# Patient Record
Sex: MALE | Race: WHITE | URBAN - METROPOLITAN AREA
[De-identification: names, ages, dates, MRNs, and addresses within clinical notes are randomized per-mention and may not be internally consistent; named-entity substitution may affect disease eponyms.]

---

## 2022-01-25 ENCOUNTER — OFFICE VISIT (OUTPATIENT)
Dept: INTERNAL MEDICINE CLINIC | Age: 52
End: 2022-01-25
Payer: COMMERCIAL

## 2022-01-25 VITALS
BODY MASS INDEX: 39.03 KG/M2 | TEMPERATURE: 98.4 F | HEART RATE: 63 BPM | SYSTOLIC BLOOD PRESSURE: 139 MMHG | DIASTOLIC BLOOD PRESSURE: 86 MMHG | WEIGHT: 278.8 LBS | HEIGHT: 71 IN | RESPIRATION RATE: 16 BRPM | OXYGEN SATURATION: 97 %

## 2022-01-25 DIAGNOSIS — I10 ESSENTIAL HYPERTENSION: ICD-10-CM

## 2022-01-25 DIAGNOSIS — L30.9 DERMATITIS: ICD-10-CM

## 2022-01-25 DIAGNOSIS — J01.10 ACUTE NON-RECURRENT FRONTAL SINUSITIS: ICD-10-CM

## 2022-01-25 DIAGNOSIS — I25.10 CORONARY ARTERY DISEASE INVOLVING NATIVE CORONARY ARTERY OF NATIVE HEART WITHOUT ANGINA PECTORIS: ICD-10-CM

## 2022-01-25 DIAGNOSIS — Z12.11 ENCOUNTER FOR SCREENING COLONOSCOPY: ICD-10-CM

## 2022-01-25 DIAGNOSIS — E66.01 CLASS 2 SEVERE OBESITY DUE TO EXCESS CALORIES WITH SERIOUS COMORBIDITY AND BODY MASS INDEX (BMI) OF 38.0 TO 38.9 IN ADULT (HCC): ICD-10-CM

## 2022-01-25 DIAGNOSIS — R97.20 ELEVATED PSA: ICD-10-CM

## 2022-01-25 DIAGNOSIS — Z00.00 PHYSICAL EXAM: Primary | ICD-10-CM

## 2022-01-25 DIAGNOSIS — Z95.5 HISTORY OF HEART ARTERY STENT: ICD-10-CM

## 2022-01-25 DIAGNOSIS — R35.89 POLYURIA: ICD-10-CM

## 2022-01-25 DIAGNOSIS — E88.81 INSULIN RESISTANCE: ICD-10-CM

## 2022-01-25 DIAGNOSIS — E78.2 MIXED HYPERLIPIDEMIA: ICD-10-CM

## 2022-01-25 DIAGNOSIS — Z12.5 SCREENING FOR PROSTATE CANCER: ICD-10-CM

## 2022-01-25 PROCEDURE — 99214 OFFICE O/P EST MOD 30 MIN: CPT | Performed by: NURSE PRACTITIONER

## 2022-01-25 RX ORDER — LOSARTAN POTASSIUM 100 MG/1
100 TABLET ORAL DAILY
COMMUNITY

## 2022-01-25 RX ORDER — EZETIMIBE 10 MG/1
10 TABLET ORAL
COMMUNITY

## 2022-01-25 RX ORDER — TRIAMCINOLONE ACETONIDE 5 MG/G
OINTMENT TOPICAL 2 TIMES DAILY
Qty: 30 G | Refills: 0 | Status: SHIPPED | OUTPATIENT
Start: 2022-01-25 | End: 2022-02-09 | Stop reason: ALTCHOICE

## 2022-01-25 RX ORDER — NEBIVOLOL 5 MG/1
5 TABLET ORAL DAILY
COMMUNITY
End: 2022-08-04 | Stop reason: DRUGHIGH

## 2022-01-25 RX ORDER — AMOXICILLIN AND CLAVULANATE POTASSIUM 875; 125 MG/1; MG/1
1 TABLET, FILM COATED ORAL 2 TIMES DAILY
Qty: 20 TABLET | Refills: 0 | Status: SHIPPED | OUTPATIENT
Start: 2022-01-25 | End: 2022-04-26

## 2022-01-25 RX ORDER — HYDROXYZINE HYDROCHLORIDE 10 MG/1
10 TABLET, FILM COATED ORAL
Qty: 30 TABLET | Refills: 0 | Status: SHIPPED | OUTPATIENT
Start: 2022-01-25 | End: 2022-02-04

## 2022-01-25 RX ORDER — AMLODIPINE BESYLATE 10 MG/1
10 TABLET ORAL DAILY
COMMUNITY
End: 2022-08-04 | Stop reason: ALTCHOICE

## 2022-01-25 NOTE — PROGRESS NOTES
Chief Complaint   Patient presents with    [facility]     new patient       SUBJECTIVE:    Yasmin Mariscal is a 46 y.o. male who is new to me, here today for a general physical exam and to discuss current medical concerns and conditions. He is fasting today. Patient states he was seen by a previous primary care provider in the past, but has been without one locally for some time. He is currently being followed by cardiology due to history of coronary artery disease and stents x4. He denies any history of MI. He states he is feeling fairly well overall, and taking his medications as prescribed. He complains of a rash that has been present to his arms and legs for the past month or so would like this looked at today. Patient is currently taking several medications for management of hypertension and dyslipidemia. He states he is tolerating the medication well, and denies any adverse side effects. In addition, he denies any chest pain, chest pressure, shortness of breath, headaches, dizziness, blurred vision, palpitations, or syncope episodes. He states he tries to be watchful of his diet overall, but not always. He is not purposefully exercising on a regular basis. He is also complaining of increased facial pressure with thick nasal discharge and possible sinus infection that has been bothering him for the past week or so. He has not been using any over-the-counter medications to treat his symptoms. He states the rash to his arms and legs started approximately 1 month ago after a hunting trip, and had a virtual visit with a provider at that time. He states he was placed on some oral steroids and topical steroids which helped somewhat, but did not like the side effects of the steroids which kept him from sleeping well. He states the rash itches worse at night, and often after showering. He states he has also noticed some increased polyuria over the past few months.   He would like to be tested for diabetes. He has been vaccinated for COVID-19. Current Outpatient Medications   Medication Sig Dispense Refill    ezetimibe (ZETIA) 10 mg tablet Take 10 mg by mouth.  losartan (COZAAR) 100 mg tablet Take 100 mg by mouth daily.  pitavastatin calcium (Livalo) 4 mg tab tablet Take 4 mg by mouth daily.  nebivoloL (BYSTOLIC) 5 mg tablet Take 5 mg by mouth daily.  amLODIPine (NORVASC) 10 mg tablet Take 10 mg by mouth daily.  amoxicillin-clavulanate (AUGMENTIN) 875-125 mg per tablet Take 1 Tablet by mouth two (2) times a day. 20 Tablet 0    triamcinolone acetonide (KENALOG) 0.5 % ointment Apply  to affected area two (2) times a day. use thin layer 30 g 0     History reviewed. No pertinent past medical history. History reviewed. No pertinent surgical history.   Not on File    REVIEW OF SYSTEMS:                                        POSITIVE= bold text  Negative = regular text    General:                     fever, chills, sweats, generalized weakness, weight loss/gain,                                       loss of appetite   Eyes:                           blurred vision, eye pain, loss of vision, double vision  ENT:                            rhinorrhea, pharyngitis, nasal discharge  Respiratory:               cough, sputum production, SOB, GRANADOS, wheezing, pleuritic pain   Cardiology:                chest pain, palpitations, orthopnea, PND, edema, syncope   Gastrointestinal:       abdominal pain , N/V, diarrhea, dysphagia, constipation, bleeding   Genitourinary:           frequency, urgency, dysuria, hematuria, incontinence   Muskuloskeletal :      arthralgia, myalgia, back pain  Hematology:              easy bruising, nose or gum bleeding, lymphadenopathy   Dermatological:         rash, ulceration, pruritis, color change / jaundice  Endocrine:                 hot flashes or polydipsia, polyuria  Neurological:             headache, dizziness, confusion, focal weakness, paresthesia,                                      Speech difficulties, memory loss, gait difficulty  Psychological:          Feelings of anxiety, depression, agitation        Social History     Socioeconomic History    Marital status: SINGLE   Tobacco Use    Smoking status: Never Smoker    Smokeless tobacco: Never Used   Vaping Use    Vaping Use: Never used   Substance and Sexual Activity    Alcohol use: Yes     Comment: occ     No family history on file. OBJECTIVE:     Visit Vitals  /86 (BP 1 Location: Left upper arm, BP Patient Position: Sitting, BP Cuff Size: Large adult)   Pulse 63   Temp 98.4 °F (36.9 °C)   Resp 16   Ht 5' 11\" (1.803 m)   Wt 278 lb 12.8 oz (126.5 kg)   SpO2 97%   BMI 38.88 kg/m²       Constitutional: He appears well nourished, of stated age, and dressed appropriately. Eyes: Sclera anicteric, PERRLA, EOMI  ENT: Nares clear, reddened turbinates bilaterally with reddened oropharynx. Neck: Supple without lymphadenopathy. Thyroid normal, No JVD or bruits  Respiratory: Clear to ascultation X5, normal inspiratory effort, no adventitious breath sounds. Cardiovascular: Regular rate and rhythm, no rubs or gallops, PMI not displaced, No thrills, no peripheral edema  Gastrointestinal: Abdomen non-distended, soft, non-tender, bowel sounds normal and active X4. Hematologic: No purpura, petechiae or unexplained bruising  Lymphatic: No lymph node enlargemant. Musculoskeletal: No joint pain or swelling on palpation. No AP/Lateral asymmetry noted. Integumentary: Large irregular reddened patches to lateral aspects of both lower extremities. Small patches to upper extremities. Likely contact in nature. There are no open or weeping areas. There is evidence of aggressive scratching. Neuro: Non-focal exam, A & O X 3  Psychiatric: Appropriate affect and demeanor, pleasant and cooperative. Patient's thought content and thought processing appear to be within normal limits.          RIGHT LOWER LEG        LEFT LOWER LEG    ASSESSMENT/PLAN:     ICD-10-CM ICD-9-CM    1. Physical exam  Z00.00 V70.9    2. Essential hypertension  I10 401.9 CBC W/O DIFF      METABOLIC PANEL, COMPREHENSIVE      TSH 3RD GENERATION      URINALYSIS W/ RFLX MICROSCOPIC      URINALYSIS W/ RFLX MICROSCOPIC      TSH 3RD GENERATION      METABOLIC PANEL, COMPREHENSIVE      CBC W/O DIFF   3. Mixed hyperlipidemia  E78.2 272.2 LIPID PANEL      LIPID PANEL   4. Coronary artery disease involving native coronary artery of native heart without angina pectoris  I25.10 414.01 LIPID PANEL      LIPID PANEL   5. Dermatitis  L30.9 692.9 triamcinolone acetonide (KENALOG) 0.5 % ointment   6. Acute non-recurrent frontal sinusitis  J01.10 461.1 amoxicillin-clavulanate (AUGMENTIN) 875-125 mg per tablet   7. Polyuria  M91.98 064.08 METABOLIC PANEL, COMPREHENSIVE      TSH 3RD GENERATION      URINALYSIS W/ RFLX MICROSCOPIC      HEMOGLOBIN A1C WITH EAG      HEMOGLOBIN A1C WITH EAG      URINALYSIS W/ RFLX MICROSCOPIC      TSH 3RD GENERATION      METABOLIC PANEL, COMPREHENSIVE   8. Class 2 severe obesity due to excess calories with serious comorbidity and body mass index (BMI) of 38.0 to 38.9 in adult ([facility])  E66.01 278.01 TSH 3RD GENERATION    Z68.38 V85.38 HEMOGLOBIN A1C WITH EAG      HEMOGLOBIN A1C WITH EAG      TSH 3RD GENERATION   9. Insulin resistance  E88.81 277.7 HEMOGLOBIN A1C WITH EAG      HEMOGLOBIN A1C WITH EAG   10. Screening for prostate cancer  Z12.5 V76.44 PSA, DIAGNOSTIC (PROSTATE SPECIFIC AG)      PSA, DIAGNOSTIC (PROSTATE SPECIFIC AG)   11. History of heart artery stent  Z95.5 V45.82 LIPID PANEL      LIPID PANEL   12. Encounter for screening colonoscopy  Z12.11 V76.51 REFERRAL FOR COLONOSCOPY     1: We will do baseline labs including: CBC, CMP, lipid panel, TSH, hemoglobin A1c, urinalysis, and PSA. 2: I will prescribe triamcinolone ointment 0.5% for patient to use to itching areas twice daily until clear.   Patient to discontinue Tide detergent. Start all Free and clear. 3: Patient will be referred to gastroenterology for colonoscopy. 4: Patient to work on healthy lifestyle management including: Low-fat/low-cholesterol diet, adequate amounts of fiber water, and exercise as tolerated. Weight loss is preferred. 5: Patient to continue all current medications for management of hypertension and dyslipidemia. Follow-up with cardiology as planned. 6: Patient to follow-up with me in approximately 3 months, or sooner as needed. Patient states understanding and agrees with plan. 7: Hydroxyzine 10 mg sent to pharmacy. Take for itching. Orders Placed This Encounter    CBC W/O DIFF    METABOLIC PANEL, COMPREHENSIVE    LIPID PANEL    TSH 3RD GENERATION    URINALYSIS W/ RFLX MICROSCOPIC    PROSTATE SPECIFIC AG    HEMOGLOBIN A1C WITH EAG    Referral for Colonoscopy (options for GI, Colon &  Rectal Surgery, & General Surgery)    ezetimibe (ZETIA) 10 mg tablet    losartan (COZAAR) 100 mg tablet    pitavastatin calcium (Livalo) 4 mg tab tablet    nebivoloL (BYSTOLIC) 5 mg tablet    amLODIPine (NORVASC) 10 mg tablet    amoxicillin-clavulanate (AUGMENTIN) 875-125 mg per tablet    triamcinolone acetonide (KENALOG) 0.5 % ointment         ATTENTION:   This medical record was transcribed using an electronic medical records system. Although proofread, it may and can contain electronic and spelling errors. Other human spelling and other errors may be present. Corrections may be executed at a later time. Please feel free to contact us for any clarifications as needed. Signed,  Tona Enriquez.  Sabrina Austin, MSN APRN FNP-BC

## 2022-01-25 NOTE — PROGRESS NOTES
Jordy Naik is a 46 y.o. male    Chief Complaint   Patient presents with   [facility]     new patient       Visit Vitals  /86 (BP 1 Location: Left upper arm, BP Patient Position: Sitting, BP Cuff Size: Large adult)   Pulse 63   Temp 98.4 °F (36.9 °C)   Resp 16   Ht 5' 11\" (1.803 m)   Wt 278 lb 12.8 oz (126.5 kg)   SpO2 97%   BMI 38.88 kg/m²           1. Have you been to the ER, urgent care clinic since your last visit? Hospitalized since your last visit? NO    2. Have you seen or consulted any other health care providers outside of the [address] [place] [address] since your last visit? Include any pap smears or colon screening.  NO

## 2022-01-26 LAB
ALBUMIN SERPL-MCNC: 4.4 G/DL (ref 3.8–4.9)
ALBUMIN/GLOB SERPL: 2.2 {RATIO} (ref 1.2–2.2)
ALP SERPL-CCNC: 68 IU/L (ref 44–121)
ALT SERPL-CCNC: 21 IU/L (ref 0–44)
APPEARANCE UR: CLEAR
AST SERPL-CCNC: 19 IU/L (ref 0–40)
BILIRUB SERPL-MCNC: 0.8 MG/DL (ref 0–1.2)
BILIRUB UR QL STRIP: NEGATIVE
BUN SERPL-MCNC: 11 MG/DL (ref 6–24)
BUN/CREAT SERPL: 10 (ref 9–20)
CALCIUM SERPL-MCNC: 9.4 MG/DL (ref 8.7–10.2)
CHLORIDE SERPL-SCNC: 105 MMOL/L (ref 96–106)
CHOLEST SERPL-MCNC: 146 MG/DL (ref 100–199)
CO2 SERPL-SCNC: 25 MMOL/L (ref 20–29)
COLOR UR: YELLOW
CREAT SERPL-MCNC: 1.05 MG/DL (ref 0.76–1.27)
ERYTHROCYTE [DISTWIDTH] IN BLOOD BY AUTOMATED COUNT: 12.5 % (ref 11.6–15.4)
EST. AVERAGE GLUCOSE BLD GHB EST-MCNC: 103 MG/DL
GLOBULIN SER CALC-MCNC: 2 G/DL (ref 1.5–4.5)
GLUCOSE SERPL-MCNC: 98 MG/DL (ref 65–99)
GLUCOSE UR QL STRIP: NEGATIVE
HBA1C MFR BLD: 5.2 % (ref 4.8–5.6)
HCT VFR BLD AUTO: 44.1 % (ref 37.5–51)
HDLC SERPL-MCNC: 51 MG/DL
HGB BLD-MCNC: 15 G/DL (ref 13–17.7)
HGB UR QL STRIP: NEGATIVE
KETONES UR QL STRIP: NEGATIVE
LDLC SERPL CALC-MCNC: 80 MG/DL (ref 0–99)
LEUKOCYTE ESTERASE UR QL STRIP: NEGATIVE
MCH RBC QN AUTO: 30.5 PG (ref 26.6–33)
MCHC RBC AUTO-ENTMCNC: 34 G/DL (ref 31.5–35.7)
MCV RBC AUTO: 90 FL (ref 79–97)
MICRO URNS: NORMAL
NITRITE UR QL STRIP: NEGATIVE
PH UR STRIP: 6.5 [PH] (ref 5–7.5)
PLATELET # BLD AUTO: 152 X10E3/UL (ref 150–450)
POTASSIUM SERPL-SCNC: 4.1 MMOL/L (ref 3.5–5.2)
PROT SERPL-MCNC: 6.4 G/DL (ref 6–8.5)
PROT UR QL STRIP: NEGATIVE
PSA SERPL-MCNC: 4.3 NG/ML (ref 0–4)
RBC # BLD AUTO: 4.92 X10E6/UL (ref 4.14–5.8)
SODIUM SERPL-SCNC: 142 MMOL/L (ref 134–144)
SP GR UR STRIP: 1.02 (ref 1–1.03)
SPECIMEN STATUS REPORT, ROLRST: NORMAL
TRIGL SERPL-MCNC: 76 MG/DL (ref 0–149)
TSH SERPL-ACNC: 1.14 UIU/ML (ref 0.45–4.5)
UROBILINOGEN UR STRIP-MCNC: 1 MG/DL (ref 0.2–1)
VLDLC SERPL CALC-MCNC: 15 MG/DL (ref 5–40)
WBC # BLD AUTO: 7 X10E3/UL (ref 3.4–10.8)

## 2022-01-27 NOTE — PROGRESS NOTES
Hemoglobin A1c, a marker for diabetes shows normal level. PSA shows to be slightly elevated above normal.  For this, I am going to refer you to a urologist for further evaluation. Urinalysis is normal with no signs of infection. Thyroid appears to be functioning normally. Cholesterol levels are in an acceptable range. Continue Livalo 4 mg daily. Kidney function, liver functions, and electrolytes are normal.  Blood count shows no signs of anemia. We will recheck these labs in 6 months.

## 2022-02-08 ENCOUNTER — PATIENT MESSAGE (OUTPATIENT)
Dept: INTERNAL MEDICINE CLINIC | Age: 52
End: 2022-02-08

## 2022-02-08 DIAGNOSIS — L30.9 DERMATITIS: Primary | ICD-10-CM

## 2022-02-09 RX ORDER — CLOBETASOL PROPIONATE 0.5 MG/G
CREAM TOPICAL 2 TIMES DAILY
Qty: 30 G | Refills: 1 | Status: SHIPPED | OUTPATIENT
Start: 2022-02-09 | End: 2022-08-04

## 2022-02-09 NOTE — TELEPHONE ENCOUNTER
From: Mary Van  To: Ishan Nguyen NP  Sent: 2/8/2022 5:36 PM EST  Subject: Rash    My rash is not getting better.  Can you prescribe a stronger cream? I will try and get into a dermatologist but thats impossible in the short term

## 2022-03-18 PROBLEM — E88.81 INSULIN RESISTANCE: Status: ACTIVE | Noted: 2022-01-25

## 2022-03-18 PROBLEM — Z95.5 HISTORY OF HEART ARTERY STENT: Status: ACTIVE | Noted: 2022-01-25

## 2022-03-18 PROBLEM — E88.819 INSULIN RESISTANCE: Status: ACTIVE | Noted: 2022-01-25

## 2022-03-18 PROBLEM — I25.10 CORONARY ARTERY DISEASE INVOLVING NATIVE CORONARY ARTERY OF NATIVE HEART WITHOUT ANGINA PECTORIS: Status: ACTIVE | Noted: 2022-01-25

## 2022-03-18 PROBLEM — R35.89 POLYURIA: Status: ACTIVE | Noted: 2022-01-25

## 2022-03-19 PROBLEM — E66.01 CLASS 2 SEVERE OBESITY DUE TO EXCESS CALORIES WITH SERIOUS COMORBIDITY AND BODY MASS INDEX (BMI) OF 38.0 TO 38.9 IN ADULT (HCC): Status: ACTIVE | Noted: 2022-01-25

## 2022-03-19 PROBLEM — E78.2 MIXED HYPERLIPIDEMIA: Status: ACTIVE | Noted: 2022-01-25

## 2022-03-19 PROBLEM — E66.812 CLASS 2 SEVERE OBESITY DUE TO EXCESS CALORIES WITH SERIOUS COMORBIDITY AND BODY MASS INDEX (BMI) OF 38.0 TO 38.9 IN ADULT: Status: ACTIVE | Noted: 2022-01-25

## 2022-03-20 PROBLEM — I10 ESSENTIAL HYPERTENSION: Status: ACTIVE | Noted: 2022-01-25

## 2022-04-26 ENCOUNTER — OFFICE VISIT (OUTPATIENT)
Dept: INTERNAL MEDICINE CLINIC | Age: 52
End: 2022-04-26
Payer: COMMERCIAL

## 2022-04-26 VITALS
HEART RATE: 72 BPM | BODY MASS INDEX: 35.9 KG/M2 | HEIGHT: 71 IN | TEMPERATURE: 98.4 F | DIASTOLIC BLOOD PRESSURE: 88 MMHG | SYSTOLIC BLOOD PRESSURE: 138 MMHG | RESPIRATION RATE: 16 BRPM | WEIGHT: 256.4 LBS | OXYGEN SATURATION: 97 %

## 2022-04-26 DIAGNOSIS — E66.01 CLASS 2 SEVERE OBESITY DUE TO EXCESS CALORIES WITH SERIOUS COMORBIDITY AND BODY MASS INDEX (BMI) OF 35.0 TO 35.9 IN ADULT (HCC): ICD-10-CM

## 2022-04-26 DIAGNOSIS — I10 ESSENTIAL HYPERTENSION: Primary | ICD-10-CM

## 2022-04-26 DIAGNOSIS — I25.10 CORONARY ARTERY DISEASE INVOLVING NATIVE CORONARY ARTERY OF NATIVE HEART WITHOUT ANGINA PECTORIS: ICD-10-CM

## 2022-04-26 DIAGNOSIS — E78.2 MIXED HYPERLIPIDEMIA: ICD-10-CM

## 2022-04-26 DIAGNOSIS — Z79.899 ON STATIN THERAPY: ICD-10-CM

## 2022-04-26 PROCEDURE — 99214 OFFICE O/P EST MOD 30 MIN: CPT | Performed by: NURSE PRACTITIONER

## 2022-04-26 RX ORDER — HYDROCHLOROTHIAZIDE 12.5 MG/1
12.5 TABLET ORAL DAILY
Qty: 90 TABLET | Refills: 1 | Status: SHIPPED | OUTPATIENT
Start: 2022-04-26 | End: 2022-08-23

## 2022-04-26 NOTE — PROGRESS NOTES
Chief Complaint   Patient presents with    Follow-up     3 month follow up       SUBJECTIVE:    Elisa Pierce is a 46 y.o. male who is here today for a follow up appointment regarding his history of hypertension, mixed hyperlipidemia, coronary artery disease with stent, and obesity. He states he is feeling well overall, and denies any new or acute complaints at this time. He states he is taking his medications as prescribed, and denies any adverse side effects he is checking his blood pressure occasionally, but not regularly. He denies any chest pain, chest pressure, shortness of breath, headaches, dizziness, blurred vision, palpitations, or syncope episodes. The patient also had previously a rash to his left lower extremity which he was seen by dermatology about. He was recommended to discontinue his nebivolol due to possibility of drug-induced rash. However, he has seen no significant improvement as a result of discontinuation of his medication. He has talked to his cardiologist about this who stated they may likely replace him with a different beta-blocker, but has not received one from them yet. Additionally, he has also been working on weight loss and RVA diet plan. He has lost approximately 22 pounds since our last encounter. He states he is feeling good about this. Current Outpatient Medications   Medication Sig Dispense Refill    hydroCHLOROthiazide (HYDRODIURIL) 12.5 mg tablet Take 1 Tablet by mouth daily. Indications: high blood pressure 90 Tablet 1    clobetasoL (TEMOVATE) 0.05 % topical cream Apply  to affected area two (2) times a day. Use thin layer 30 g 1    ezetimibe (ZETIA) 10 mg tablet Take 10 mg by mouth.  losartan (COZAAR) 100 mg tablet Take 100 mg by mouth daily.  pitavastatin calcium (Livalo) 4 mg tab tablet Take 4 mg by mouth daily.  amLODIPine (NORVASC) 10 mg tablet Take 10 mg by mouth daily.       nebivoloL (BYSTOLIC) 5 mg tablet Take 5 mg by mouth daily.       History reviewed. No pertinent past medical history. History reviewed. No pertinent surgical history. Not on File    REVIEW OF SYSTEMS:                                        POSITIVE= bold text  Negative = regular text    General:                     fever, chills, sweats, generalized weakness, weight loss/gain,                                       loss of appetite   Eyes:                           blurred vision, eye pain, loss of vision, double vision  ENT:                            rhinorrhea, pharyngitis   Respiratory:               cough, sputum production, SOB, GRANADOS, wheezing, pleuritic pain   Cardiology:                chest pain, palpitations, orthopnea, PND, edema, syncope   Gastrointestinal:       abdominal pain , N/V, diarrhea, dysphagia, constipation, bleeding   Genitourinary:           frequency, urgency, dysuria, hematuria, incontinence   Muskuloskeletal :      arthralgia, myalgia, back pain  Hematology:              easy bruising, nose or gum bleeding, lymphadenopathy   Dermatological:         rash, ulceration, pruritis, color change / jaundice  Endocrine:                 hot flashes or polydipsia   Neurological:             headache, dizziness, confusion, focal weakness, paresthesia,                                      Speech difficulties, memory loss, gait difficulty  Psychological:          Feelings of anxiety, depression, agitation        Social History     Socioeconomic History    Marital status: SINGLE   Tobacco Use    Smoking status: Never Smoker    Smokeless tobacco: Never Used   Vaping Use    Vaping Use: Never used   Substance and Sexual Activity    Alcohol use: Yes     Comment: occ     History reviewed. No pertinent family history.     OBJECTIVE:     Visit Vitals  /88 (BP 1 Location: Left upper arm, BP Patient Position: Sitting)   Pulse 72   Temp 98.4 °F (36.9 °C)   Resp 16   Ht 5' 11\" (1.803 m)   Wt 256 lb 6.4 oz (116.3 kg)   SpO2 97%   BMI 35.76 kg/m² Constitutional: He appears well nourished, of stated age, and dressed appropriately. Eyes: Sclera anicteric, PERRLA, EOMI  Neck: Supple without lymphadenopathy. Thyroid normal, No JVD or bruits  Respiratory: Clear to ascultation X5, normal inspiratory effort, no adventitious breath sounds. Cardiovascular: Regular rate and rhythm, no rubs or gallops, PMI not displaced, No thrills, no peripheral edema  Neuro: Non-focal exam, A & O X 3.   Psychiatric: Appropriate affect and demeanor, pleasant and cooperative. Patient's thought content and thought processing appear to be within normal limits. ASSESSMENT/PLAN:     ICD-10-CM ICD-9-CM    1. Essential hypertension  Y47 713.1 METABOLIC PANEL, COMPREHENSIVE      hydroCHLOROthiazide (HYDRODIURIL) 12.5 mg tablet      METABOLIC PANEL, COMPREHENSIVE   2. Mixed hyperlipidemia  E78.2 272.2 LIPID PANEL      LIPID PANEL   3. Coronary artery disease involving native coronary artery of native heart without angina pectoris  I25.10 414.01    4. Class 2 severe obesity due to excess calories with serious comorbidity and body mass index (BMI) of 35.0 to 35.9 in adult ([facility])  E66.01 278.01     Z68.35 V85.35    5. On statin therapy  Z79.899 V58.69      1: We will repeat labs today including: CMP, and lipid panel. 2: I will add HCTZ 12.5 mg to patient's current medication regimen for improved management of hypertension. 3: Follow-up with cardiology as planned. 4: Continue healthy lifestyle management and weight loss. 5: Continue all other medications as directed. 6: Follow-up with me in approximately 6 months, or sooner as needed. Patient states understanding and agrees with plan. Orders Placed This Encounter    METABOLIC PANEL, COMPREHENSIVE    LIPID PANEL    hydroCHLOROthiazide (HYDRODIURIL) 12.5 mg tablet         ATTENTION:   This medical record was transcribed using an electronic medical records system.   Although proofread, it may and can contain electronic and spelling errors. Other human spelling and other errors may be present. Corrections may be executed at a later time. Please feel free to contact us for any clarifications as needed. Follow-up and Dispositions    · Return in about 6 months (around 10/26/2022) for Follow up with fasting labs. Signed,  Selene Lanes.  Pretty Chapman, MSN APRN FNP-BC

## 2022-04-26 NOTE — PROGRESS NOTES
Nadine Baker is a 46 y.o. male    Chief Complaint   Patient presents with    Follow-up     3 month follow up       Visit Vitals  BP (!) 138/90 (BP 1 Location: Left upper arm, BP Patient Position: Sitting, BP Cuff Size: Large adult)   Pulse 72   Temp 98.4 °F (36.9 °C)   Resp 16   Ht 5' 11\" (1.803 m)   Wt 256 lb 6.4 oz (116.3 kg)   SpO2 97%   BMI 35.76 kg/m²           1. Have you been to the ER, urgent care clinic since your last visit? Hospitalized since your last visit? NO    2. Have you seen or consulted any other health care providers outside of the [address] [place] [address] since your last visit? Include any pap smears or colon screening.  NO

## 2022-04-27 LAB
ALBUMIN SERPL-MCNC: 4.6 G/DL (ref 3.8–4.9)
ALBUMIN/GLOB SERPL: 2.3 {RATIO} (ref 1.2–2.2)
ALP SERPL-CCNC: 79 IU/L (ref 44–121)
ALT SERPL-CCNC: 18 IU/L (ref 0–44)
AST SERPL-CCNC: 20 IU/L (ref 0–40)
BILIRUB SERPL-MCNC: 0.6 MG/DL (ref 0–1.2)
BUN SERPL-MCNC: 11 MG/DL (ref 6–24)
BUN/CREAT SERPL: 14 (ref 9–20)
CALCIUM SERPL-MCNC: 9.4 MG/DL (ref 8.7–10.2)
CHLORIDE SERPL-SCNC: 103 MMOL/L (ref 96–106)
CHOLEST SERPL-MCNC: 147 MG/DL (ref 100–199)
CO2 SERPL-SCNC: 23 MMOL/L (ref 20–29)
CREAT SERPL-MCNC: 0.78 MG/DL (ref 0.76–1.27)
EGFR: 107 ML/MIN/1.73
GLOBULIN SER CALC-MCNC: 2 G/DL (ref 1.5–4.5)
GLUCOSE SERPL-MCNC: 82 MG/DL (ref 65–99)
HDLC SERPL-MCNC: 50 MG/DL
LDLC SERPL CALC-MCNC: 83 MG/DL (ref 0–99)
POTASSIUM SERPL-SCNC: 4.1 MMOL/L (ref 3.5–5.2)
PROT SERPL-MCNC: 6.6 G/DL (ref 6–8.5)
SODIUM SERPL-SCNC: 142 MMOL/L (ref 134–144)
TRIGL SERPL-MCNC: 70 MG/DL (ref 0–149)
VLDLC SERPL CALC-MCNC: 14 MG/DL (ref 5–40)

## 2022-04-28 NOTE — PROGRESS NOTES
Cholesterol levels look good overall. Continue Livalo 4 mg daily. Metabolic panel is normal.  Kidney function, liver functions, and electrolytes are within normal limits.

## 2022-08-04 ENCOUNTER — OFFICE VISIT (OUTPATIENT)
Dept: INTERNAL MEDICINE CLINIC | Age: 52
End: 2022-08-04
Payer: COMMERCIAL

## 2022-08-04 VITALS
BODY MASS INDEX: 36.93 KG/M2 | HEART RATE: 65 BPM | SYSTOLIC BLOOD PRESSURE: 134 MMHG | DIASTOLIC BLOOD PRESSURE: 82 MMHG | RESPIRATION RATE: 16 BRPM | HEIGHT: 71 IN | OXYGEN SATURATION: 97 % | WEIGHT: 263.8 LBS | TEMPERATURE: 98.1 F

## 2022-08-04 DIAGNOSIS — I10 ESSENTIAL HYPERTENSION: Primary | ICD-10-CM

## 2022-08-04 DIAGNOSIS — E66.01 CLASS 2 SEVERE OBESITY DUE TO EXCESS CALORIES WITH SERIOUS COMORBIDITY AND BODY MASS INDEX (BMI) OF 36.0 TO 36.9 IN ADULT (HCC): ICD-10-CM

## 2022-08-04 DIAGNOSIS — Z79.899 ON STATIN THERAPY: ICD-10-CM

## 2022-08-04 DIAGNOSIS — R97.20 ELEVATED PSA: ICD-10-CM

## 2022-08-04 DIAGNOSIS — J01.90 ACUTE NON-RECURRENT SINUSITIS, UNSPECIFIED LOCATION: ICD-10-CM

## 2022-08-04 DIAGNOSIS — E78.2 MIXED HYPERLIPIDEMIA: ICD-10-CM

## 2022-08-04 DIAGNOSIS — J06.9 UPPER RESPIRATORY TRACT INFECTION, UNSPECIFIED TYPE: ICD-10-CM

## 2022-08-04 DIAGNOSIS — Z86.16 HISTORY OF COVID-19: ICD-10-CM

## 2022-08-04 DIAGNOSIS — L30.9 CHRONIC DERMATITIS: ICD-10-CM

## 2022-08-04 PROBLEM — E66.812 CLASS 2 SEVERE OBESITY DUE TO EXCESS CALORIES WITH SERIOUS COMORBIDITY AND BODY MASS INDEX (BMI) OF 36.0 TO 36.9 IN ADULT: Status: ACTIVE | Noted: 2022-01-25

## 2022-08-04 PROCEDURE — 99214 OFFICE O/P EST MOD 30 MIN: CPT | Performed by: NURSE PRACTITIONER

## 2022-08-04 RX ORDER — PIMECROLIMUS 10 MG/G
CREAM TOPICAL
COMMUNITY
Start: 2022-06-23

## 2022-08-04 RX ORDER — CLOBETASOL PROPIONATE 0.5 MG/G
OINTMENT TOPICAL
COMMUNITY
Start: 2022-08-01

## 2022-08-04 RX ORDER — ASPIRIN 81 MG/1
81 TABLET ORAL DAILY
COMMUNITY

## 2022-08-04 RX ORDER — DOXYCYCLINE 100 MG/1
100 TABLET ORAL 2 TIMES DAILY
Qty: 20 TABLET | Refills: 0 | Status: SHIPPED | OUTPATIENT
Start: 2022-08-04

## 2022-08-04 RX ORDER — NEBIVOLOL 10 MG/1
10 TABLET ORAL DAILY
Qty: 90 TABLET | Refills: 1 | Status: SHIPPED | OUTPATIENT
Start: 2022-08-04

## 2022-08-04 RX ORDER — HYDROXYZINE 25 MG/1
TABLET, FILM COATED ORAL
COMMUNITY
Start: 2022-07-31

## 2022-08-04 NOTE — PROGRESS NOTES
Chief Complaint   Patient presents with    Ankle swelling    Rash       SUBJECTIVE:    Shawn Heath is a 46 y.o. male who is here today for a follow up appointment regarding his hypertension, mixed hyperlipidemia, and chronic recurrent dermatitis to extremities. Patient is also being managed by dermatology as well as cardiology due to history of MI. He states he is compliant with his current medications, and denies any adverse side effects. His blood pressure appears well controlled at this time. He denies any symptoms of chest pain, chest pressure, shortness of breath, headaches, dizziness, blurred vision, palpitations, or syncope episodes. More recently however, the patient was COVID-positive in late July. He denies any significant sequelae, but states he has an ongoing cough with occasional production. He denies any shortness of breath or fever. He would like this evaluated today. Additionally, the patient suffers from chronic recurrent dermatitis to his lower extremities and to patchy areas of his upper extremities and upper back. He states he is currently being followed by dermatology for this, and was recently recommended to use an injectable medication to help relieve his symptoms. He has been using steroid creams and tablets traditionally for severe outbreaks, but states his symptoms are somewhat minimized at this time. He states his rash to his legs, especially, gets quite inflamed and starts weeping clear fluid which lasts approximately 1 to 2 days, then begins to subside. He states the rash is quite pruritic at the time of maximum inflammation. Current Outpatient Medications   Medication Sig Dispense Refill    doxycycline (ADOXA) 100 mg tablet Take 1 Tablet by mouth two (2) times a day. 20 Tablet 0    nebivoloL (BYSTOLIC) 10 mg tablet Take 1 Tablet by mouth in the morning.  Indications: high blood pressure 90 Tablet 1    hydroCHLOROthiazide (HYDRODIURIL) 12.5 mg tablet Take 1 Tablet by mouth daily. Indications: high blood pressure 90 Tablet 1    ezetimibe (ZETIA) 10 mg tablet Take 10 mg by mouth.      losartan (COZAAR) 100 mg tablet Take 100 mg by mouth daily. pitavastatin calcium (LIVALO) 4 mg tab tablet Take 4 mg by mouth daily. aspirin delayed-release 81 mg tablet Take 81 mg by mouth in the morning.      hydrOXYzine HCL (ATARAX) 25 mg tablet       pimecrolimus (ELIDEL) 1 % topical cream       clobetasoL (TEMOVATE) 0.05 % ointment        History reviewed. No pertinent past medical history. History reviewed. No pertinent surgical history.   No Known Allergies    REVIEW OF SYSTEMS:                                        POSITIVE= bold text  Negative = regular text    General:                     fever, chills, sweats, generalized weakness, weight loss/gain,                                       loss of appetite   Eyes:                           blurred vision, eye pain, loss of vision, double vision  ENT:                            rhinorrhea, pharyngitis   Respiratory:               cough, sputum production, SOB, GRANADOS, wheezing, pleuritic pain   Cardiology:                chest pain, palpitations, orthopnea, PND, edema, syncope   Gastrointestinal:       abdominal pain , N/V, diarrhea, dysphagia, constipation, bleeding   Genitourinary:           frequency, urgency, dysuria, hematuria, incontinence   Muskuloskeletal :      arthralgia, myalgia, back pain  Hematology:              easy bruising, nose or gum bleeding, lymphadenopathy   Dermatological:         rash, ulceration, pruritis, color change / jaundice  Endocrine:                 hot flashes or polydipsia   Neurological:             headache, dizziness, confusion, focal weakness, paresthesia,                                      Speech difficulties, memory loss, gait difficulty  Psychological:          Feelings of anxiety, depression, agitation        Social History     Socioeconomic History    Marital status: SINGLE Tobacco Use    Smoking status: Never    Smokeless tobacco: Never   Vaping Use    Vaping Use: Never used   Substance and Sexual Activity    Alcohol use: Yes     Comment: occ     Social Determinants of Health     Financial Resource Strain: Low Risk     Difficulty of Paying Living Expenses: Not hard at all   Food Insecurity: No Food Insecurity    Worried About [address] in the Last Year: Never true    Ran Out of Food in the Last Year: Never true     History reviewed. No pertinent family history. OBJECTIVE:     Visit Vitals  /82 (BP 1 Location: Left arm, BP Patient Position: Sitting, BP Cuff Size: Adult)   Pulse 65   Temp 98.1 °F (36.7 °C) (Oral)   Resp 16   Ht 5' 11\" (1.803 m)   Wt 263 lb 12.8 oz (119.7 kg)   SpO2 97%   BMI 36.79 kg/m²       Constitutional: He appears well nourished, of stated age, and dressed appropriately. Eyes: Sclera anicteric, PERRLA, EOMI  ENT: Nares clear, slightly reddened oropharynx. Neck: Supple without lymphadenopathy. Thyroid normal, No JVD or bruits  Respiratory: Clear to ascultation X5, normal inspiratory effort, no adventitious breath sounds. Cardiovascular: Regular rate and rhythm, no murmurs, rubs or gallops, PMI not displaced, No thrills, 1+ edema to lower extremities. Integumentary: Diffuse maculopapular rash to lateral sides of lower extremities. No open areas or signs of weeping. Slight dry flaking areas to borders. Peripheral Vascular: Normal pulses palpable to PT/DP. Neuro: Non-focal exam, A & O X 3.   Psychiatric: Appropriate affect and demeanor, pleasant and cooperative. Patient's thought content and thought processing appear to be within normal limits. LEFT LOWER LEG      RIGHT LOWER LEG    ASSESSMENT/PLAN:     ICD-10-CM ICD-9-CM    1. Essential hypertension  I10 401.9 nebivoloL (BYSTOLIC) 10 mg tablet      CBC WITH AUTOMATED DIFF      METABOLIC PANEL, COMPREHENSIVE      METABOLIC PANEL, COMPREHENSIVE      CBC WITH AUTOMATED DIFF      2. Mixed hyperlipidemia  E78.2 272.2 LIPID PANEL      LIPID PANEL      3. Chronic dermatitis  L30.9 692.9 CBC WITH AUTOMATED DIFF      C REACTIVE PROTEIN, QT      ANTI-NUCLEAR AB BY IFA (RDL)      RHEUMATOID FACTOR, QT      RHEUMATOID FACTOR, QT      ANTI-NUCLEAR AB BY IFA (RDL)      C REACTIVE PROTEIN, QT      CBC WITH AUTOMATED DIFF      4. Class 2 severe obesity due to excess calories with serious comorbidity and body mass index (BMI) of 36.0 to 36.9 in adult ([facility])  E66.01 278.01     Z68.36 V85.36       5. Upper respiratory tract infection, unspecified type  J06.9 465.9 doxycycline (ADOXA) 100 mg tablet      6. Acute non-recurrent sinusitis, unspecified location  J01.90 461.9 doxycycline (ADOXA) 100 mg tablet      7. Elevated PSA  R97.20 790.93 PSA SCREENING (SCREENING)      PSA SCREENING (SCREENING)      8. History of COVID-19  Z86.16 V12.09       9. On statin therapy  Z79.899 V58.69 CK      CK        1: Labs ordered today include: CBC, CRP, NANCY, rheumatoid factor, CK, lipid panel, CMP, and repeat PSA due to history of elevation. 2: Patient will be given doxycycline 100 mg to take twice daily for upper respiratory infection/sinus infection. Continue all other over-the-counter medications as well.  3: Patient to discontinue Norvasc due to swelling. Will resume Bystolic at 10 mg daily. Monitor blood pressures closely. 4: Continue all other blood pressure medications as directed. 5: Follow-up with dermatology as planned. 6: Continue healthy lifestyle management and appropriate dietary intake. 7: Follow-up with me in approximately 4 months, or sooner as needed. Patient states understanding and agrees with plan.       Orders Placed This Encounter    CBC WITH AUTOMATED DIFF    C REACTIVE PROTEIN, QT    ANTI-NUCLEAR AB BY IFA (RDL)    RHEUMATOID FACTOR, QT    CK    LIPID PANEL    METABOLIC PANEL, COMPREHENSIVE    PSA SCREENING (SCREENING)    doxycycline (ADOXA) 100 mg tablet    nebivoloL (BYSTOLIC) 10 mg tablet aspirin delayed-release 81 mg tablet    hydrOXYzine HCL (ATARAX) 25 mg tablet    pimecrolimus (ELIDEL) 1 % topical cream    clobetasoL (TEMOVATE) 0.05 % ointment         ATTENTION:   This medical record was transcribed using an electronic medical records system. Although proofread, it may and can contain electronic and spelling errors. Other human spelling and other errors may be present. Corrections may be executed at a later time. Please feel free to contact us for any clarifications as needed. Follow-up and Dispositions    Return in about 4 months (around 12/4/2022) for Follow up with fasting labs. Signed,  Riley Rehman.  Marty Garrett, MSN APRN FNP-BC

## 2022-08-04 NOTE — PROGRESS NOTES
Amarilis Davidson is a 46 y.o. male     Chief Complaint   Patient presents with    Ankle swelling    Rash       Visit Vitals  /82 (BP 1 Location: Left arm, BP Patient Position: Sitting, BP Cuff Size: Adult)   Pulse 65   Temp 98.1 °F (36.7 °C) (Oral)   Resp 16   Ht 5' 11\" (1.803 m)   Wt 263 lb 12.8 oz (119.7 kg)   SpO2 97%   BMI 36.79 kg/m²       Health Maintenance Due   Topic Date Due    DTaP/Tdap/Td series (1 - Tdap) Never done    Colorectal Cancer Screening Combo  Never done    Shingrix Vaccine Age 50> (1 of 2) Never done    COVID-19 Vaccine (4 - Booster for [employer] series) 01/27/2022         1. \"Have you been to the ER, urgent care clinic since your last visit? Hospitalized since your last visit? \" No    2. \"Have you seen or consulted any other health care providers outside of the [address] [place] [address] since your last visit? \"  Yes seen dermatologist      3. For patients aged 39-70: Has the patient had a colonoscopy / FIT/ Cologuard? Yes - Care Gap present. Rooming MA/LPN to request most recent results      If the patient is female:    4. For patients aged 41-77: Has the patient had a mammogram within the past 2 years? NA - based on age or sex      11. For patients aged 21-65: Has the patient had a pap smear?  NA - based on age or sex

## 2022-08-10 ENCOUNTER — PATIENT MESSAGE (OUTPATIENT)
Dept: INTERNAL MEDICINE CLINIC | Age: 52
End: 2022-08-10

## 2022-08-10 DIAGNOSIS — R76.8 POSITIVE ANA (ANTINUCLEAR ANTIBODY): Primary | ICD-10-CM

## 2022-08-10 DIAGNOSIS — Z82.61 FAMILY HISTORY OF RHEUMATOID ARTHRITIS: ICD-10-CM

## 2022-08-10 DIAGNOSIS — R21 GENERALIZED MACULOPAPULAR RASH: ICD-10-CM

## 2022-08-10 LAB
ALBUMIN SERPL-MCNC: 4.6 G/DL (ref 3.8–4.9)
ALBUMIN/GLOB SERPL: 2.1 {RATIO} (ref 1.2–2.2)
ALP SERPL-CCNC: 66 IU/L (ref 44–121)
ALT SERPL-CCNC: 16 IU/L (ref 0–44)
ANA HOMOGEN TITR SER: ABNORMAL {TITER}
ANA SER QL IF: POSITIVE
AST SERPL-CCNC: 17 IU/L (ref 0–40)
BASOPHILS # BLD AUTO: 0 X10E3/UL (ref 0–0.2)
BASOPHILS NFR BLD AUTO: 1 %
BILIRUB SERPL-MCNC: 0.4 MG/DL (ref 0–1.2)
BUN SERPL-MCNC: 23 MG/DL (ref 6–24)
BUN/CREAT SERPL: 24 (ref 9–20)
CALCIUM SERPL-MCNC: 9.7 MG/DL (ref 8.7–10.2)
CHLORIDE SERPL-SCNC: 106 MMOL/L (ref 96–106)
CHOLEST SERPL-MCNC: 162 MG/DL (ref 100–199)
CK SERPL-CCNC: 119 U/L (ref 41–331)
CO2 SERPL-SCNC: 21 MMOL/L (ref 20–29)
CREAT SERPL-MCNC: 0.94 MG/DL (ref 0.76–1.27)
CRP SERPL-MCNC: 2 MG/L (ref 0–10)
EGFR: 97 ML/MIN/1.73
EOSINOPHIL # BLD AUTO: 0.1 X10E3/UL (ref 0–0.4)
EOSINOPHIL NFR BLD AUTO: 2 %
ERYTHROCYTE [DISTWIDTH] IN BLOOD BY AUTOMATED COUNT: 12.2 % (ref 11.6–15.4)
GLOBULIN SER CALC-MCNC: 2.2 G/DL (ref 1.5–4.5)
GLUCOSE SERPL-MCNC: 97 MG/DL (ref 65–99)
HCT VFR BLD AUTO: 43 % (ref 37.5–51)
HDLC SERPL-MCNC: 57 MG/DL
HGB BLD-MCNC: 14.8 G/DL (ref 13–17.7)
IMM GRANULOCYTES # BLD AUTO: 0 X10E3/UL (ref 0–0.1)
IMM GRANULOCYTES NFR BLD AUTO: 1 %
LDLC SERPL CALC-MCNC: 89 MG/DL (ref 0–99)
LYMPHOCYTES # BLD AUTO: 1.5 X10E3/UL (ref 0.7–3.1)
LYMPHOCYTES NFR BLD AUTO: 23 %
MCH RBC QN AUTO: 30.8 PG (ref 26.6–33)
MCHC RBC AUTO-ENTMCNC: 34.4 G/DL (ref 31.5–35.7)
MCV RBC AUTO: 90 FL (ref 79–97)
MONOCYTES # BLD AUTO: 0.5 X10E3/UL (ref 0.1–0.9)
MONOCYTES NFR BLD AUTO: 8 %
NEUTROPHILS # BLD AUTO: 4.3 X10E3/UL (ref 1.4–7)
NEUTROPHILS NFR BLD AUTO: 65 %
NOTE, 018286: ABNORMAL
PLATELET # BLD AUTO: 162 X10E3/UL (ref 150–450)
POTASSIUM SERPL-SCNC: 3.9 MMOL/L (ref 3.5–5.2)
PROT SERPL-MCNC: 6.8 G/DL (ref 6–8.5)
PSA SERPL-MCNC: 3.8 NG/ML (ref 0–4)
RBC # BLD AUTO: 4.8 X10E6/UL (ref 4.14–5.8)
RHEUMATOID FACT SERPL-ACNC: <10 IU/ML (ref 0–15)
SODIUM SERPL-SCNC: 145 MMOL/L (ref 134–144)
TRIGL SERPL-MCNC: 85 MG/DL (ref 0–149)
VLDLC SERPL CALC-MCNC: 16 MG/DL (ref 5–40)
WBC # BLD AUTO: 6.5 X10E3/UL (ref 3.4–10.8)

## 2022-08-10 NOTE — PROGRESS NOTES
Prostate level now in normal range. Good. Metabolic panel is unremarkable. Cholesterol levels look fairly good overall. Blood counts are normal.    Antinuclear antibody test is positive demonstrating concern for possible autoimmune condition, and may be source of your recurrent rash(es). NANCY shows elevated level as well. I will need to refer you to a rheumatologist for further evaluation.   Let me know if you are okay with this and I will send a referral.

## 2022-08-10 NOTE — TELEPHONE ENCOUNTER
----- Message from Rl Wheat 44 sent at 8/10/2022  8:30 AM EDT -----  Regarding: FW: Question regarding LIPID PANEL    ----- Message -----  From: Alyse Pinedo  Sent: 8/10/2022   8:05 AM EDT  To: [facility]  Subject: Question regarding LIPID PANEL                   Thanks. Go ahead and refer me.

## 2022-08-11 ENCOUNTER — PATIENT MESSAGE (OUTPATIENT)
Dept: INTERNAL MEDICINE CLINIC | Age: 52
End: 2022-08-11

## 2022-08-11 DIAGNOSIS — R21 GENERALIZED MACULOPAPULAR RASH: ICD-10-CM

## 2022-08-11 DIAGNOSIS — L30.9 CHRONIC DERMATITIS: ICD-10-CM

## 2022-08-11 DIAGNOSIS — R76.8 POSITIVE ANA (ANTINUCLEAR ANTIBODY): Primary | ICD-10-CM

## 2022-08-16 NOTE — TELEPHONE ENCOUNTER
----- Message from Kenneth Herron sent at 8/15/2022  1:56 PM EDT -----  Regarding: FW: Question regarding CK    ----- Message -----  From: Jaz Mathews  Sent: 8/15/2022  10:03 AM EDT  To: [facility]  Subject: Question regarding CK                            The doctor you referred me to is booked until August, 2023.

## 2022-08-23 DIAGNOSIS — I10 ESSENTIAL HYPERTENSION: ICD-10-CM

## 2022-08-23 RX ORDER — HYDROCHLOROTHIAZIDE 12.5 MG/1
TABLET ORAL
Qty: 90 TABLET | Refills: 1 | Status: SHIPPED | OUTPATIENT
Start: 2022-08-23

## 2022-09-08 RX ORDER — AMOXICILLIN AND CLAVULANATE POTASSIUM 875; 125 MG/1; MG/1
1 TABLET, FILM COATED ORAL EVERY 12 HOURS
Qty: 20 TABLET | Refills: 0 | Status: SHIPPED | OUTPATIENT
Start: 2022-09-08

## 2022-12-06 ENCOUNTER — OFFICE VISIT (OUTPATIENT)
Dept: INTERNAL MEDICINE CLINIC | Age: 52
End: 2022-12-06
Payer: COMMERCIAL

## 2022-12-06 VITALS
SYSTOLIC BLOOD PRESSURE: 134 MMHG | WEIGHT: 258.4 LBS | HEART RATE: 62 BPM | HEIGHT: 71 IN | OXYGEN SATURATION: 98 % | BODY MASS INDEX: 36.18 KG/M2 | DIASTOLIC BLOOD PRESSURE: 82 MMHG | TEMPERATURE: 98.2 F | RESPIRATION RATE: 16 BRPM

## 2022-12-06 DIAGNOSIS — I10 ESSENTIAL HYPERTENSION: Primary | ICD-10-CM

## 2022-12-06 DIAGNOSIS — Z79.899 ON STATIN THERAPY: ICD-10-CM

## 2022-12-06 DIAGNOSIS — R09.82 PND (POST-NASAL DRIP): ICD-10-CM

## 2022-12-06 DIAGNOSIS — R05.8 DRY COUGH: ICD-10-CM

## 2022-12-06 DIAGNOSIS — J30.89 ENVIRONMENTAL AND SEASONAL ALLERGIES: ICD-10-CM

## 2022-12-06 DIAGNOSIS — E78.2 MIXED HYPERLIPIDEMIA: ICD-10-CM

## 2022-12-06 PROCEDURE — 99214 OFFICE O/P EST MOD 30 MIN: CPT | Performed by: NURSE PRACTITIONER

## 2022-12-06 PROCEDURE — 3075F SYST BP GE 130 - 139MM HG: CPT | Performed by: NURSE PRACTITIONER

## 2022-12-06 PROCEDURE — 3079F DIAST BP 80-89 MM HG: CPT | Performed by: NURSE PRACTITIONER

## 2022-12-06 RX ORDER — DUPILUMAB 300 MG/2ML
INJECTION, SOLUTION SUBCUTANEOUS
COMMUNITY
Start: 2022-11-20

## 2022-12-06 RX ORDER — FLUTICASONE PROPIONATE 50 MCG
2 SPRAY, SUSPENSION (ML) NASAL DAILY
Qty: 1 EACH | Refills: 5 | Status: SHIPPED | OUTPATIENT
Start: 2022-12-06

## 2022-12-06 NOTE — PROGRESS NOTES
Checo Torres is a 46 y.o. male     Chief Complaint   Patient presents with    Hypertension     4M       Visit Vitals  /82 (BP 1 Location: Left upper arm, BP Patient Position: Sitting, BP Cuff Size: Adult)   Pulse 62   Temp 98.2 °F (36.8 °C) (Oral)   Resp 16   Ht 5' 11\" (1.803 m)   Wt 258 lb 6.4 oz (117.2 kg)   SpO2 98%   BMI 36.04 kg/m²       Health Maintenance Due   Topic Date Due    DTaP/Tdap/Td series (1 - Tdap) Never done    Shingrix Vaccine Age 50> (1 of 2) Never done    COVID-19 Vaccine (5 - Booster for [employer] series) 08/15/2022         1. \"Have you been to the ER, urgent care clinic since your last visit? Hospitalized since your last visit? \" No    2. \"Have you seen or consulted any other health care providers outside of the [address] [place] [address] since your last visit? \" No     3. For patients aged 39-70: Has the patient had a colonoscopy / FIT/ Cologuard? Yes - no Care Gap present      If the patient is female:    4. For patients aged 41-77: Has the patient had a mammogram within the past 2 years? NA - based on age or sex      11. For patients aged 21-65: Has the patient had a pap smear?  NA - based on age or sex

## 2022-12-06 NOTE — PROGRESS NOTES
Chief Complaint   Patient presents with    Hypertension     2M       SUBJECTIVE:    Berenice Candelaria is a 46 y.o. male who is here today for a follow up appointment regarding current medical conditions including: Essential hypertension and mixed hyperlipidemia. He states he is taking his blood pressure medication as prescribed and denies any adverse side effects. His blood pressure appears well controlled at this time. He continues to take Livalo and Zetia daily for management of his cholesterol. His LDL has been in the 80s with this combination. He denies any recent episodes of chest pain, chest pressure, shortness of breath, headaches, dizziness, blurred vision, palpitations, or syncope episodes. In addition, he states he has been having an ongoing cough/throat clearing with nasal congestion since his last episode of COVID in July. He states he has been using some over-the-counter Allegra on occasion, but not consistently. He states the medication is somewhat helpful when used. He states he is having clear to occasional green nasal discharge, and his cough remains unproductive. He would like this evaluated today. Current Outpatient Medications   Medication Sig Dispense Refill    Dupixent Pen 300 mg/2 mL pnij       fluticasone propionate (FLONASE) 50 mcg/actuation nasal spray 2 Sprays by Both Nostrils route daily. 1 Each 5    hydroCHLOROthiazide (HYDRODIURIL) 12.5 mg tablet TAKE ONE TABLET BY MOUTH ONE TIME DAILY FOR HIGH BLOOD PRESSURE 90 Tablet 1    nebivoloL (BYSTOLIC) 10 mg tablet Take 1 Tablet by mouth in the morning. Indications: high blood pressure 90 Tablet 1    aspirin delayed-release 81 mg tablet Take 81 mg by mouth in the morning.      hydrOXYzine HCL (ATARAX) 25 mg tablet       ezetimibe (ZETIA) 10 mg tablet Take 10 mg by mouth.      losartan (COZAAR) 100 mg tablet Take 100 mg by mouth daily. pitavastatin calcium (LIVALO) 4 mg tab tablet Take 4 mg by mouth daily. amoxicillin-clavulanate (AUGMENTIN) 875-125 mg per tablet Take 1 Tablet by mouth every twelve (12) hours. (Patient not taking: Reported on 12/6/2022) 20 Tablet 0    doxycycline (ADOXA) 100 mg tablet Take 1 Tablet by mouth two (2) times a day. (Patient not taking: Reported on 12/6/2022) 20 Tablet 0    pimecrolimus (ELIDEL) 1 % topical cream  (Patient not taking: Reported on 12/6/2022)      clobetasoL (TEMOVATE) 0.05 % ointment  (Patient not taking: Reported on 12/6/2022)       History reviewed. No pertinent past medical history. History reviewed. No pertinent surgical history.   No Known Allergies    REVIEW OF SYSTEMS:                                        POSITIVE= bold text  Negative = regular text    General:                     fever, chills, sweats, generalized weakness, weight loss/gain,                                       loss of appetite   Eyes:                           blurred vision, eye pain, loss of vision, double vision  ENT:                            rhinorrhea, pharyngitis   Respiratory:               cough, sputum production, SOB, GRANADOS, wheezing, pleuritic pain   Cardiology:                chest pain, palpitations, orthopnea, PND, edema, syncope   Gastrointestinal:       abdominal pain , N/V, diarrhea, dysphagia, constipation, bleeding   Genitourinary:           frequency, urgency, dysuria, hematuria, incontinence   Muskuloskeletal :      arthralgia, myalgia, back pain  Hematology:              easy bruising, nose or gum bleeding, lymphadenopathy   Dermatological:         rash, ulceration, pruritis, color change / jaundice  Endocrine:                 hot flashes or polydipsia   Neurological:             headache, dizziness, confusion, focal weakness, paresthesia,                                      Speech difficulties, memory loss, gait difficulty  Psychological:          Feelings of anxiety, depression, agitation        Social History     Socioeconomic History    Marital status: SINGLE   Tobacco Use    Smoking status: Never    Smokeless tobacco: Never   Vaping Use    Vaping Use: Never used   Substance and Sexual Activity    Alcohol use: Yes     Comment: occ     Social Determinants of Health     Financial Resource Strain: Low Risk     Difficulty of Paying Living Expenses: Not hard at all   Food Insecurity: No Food Insecurity    Worried About [address] in the Last Year: Never true    Ran Out of Food in the Last Year: Never true     History reviewed. No pertinent family history. OBJECTIVE:     Visit Vitals  /82 (BP 1 Location: Left upper arm, BP Patient Position: Sitting, BP Cuff Size: Adult)   Pulse 62   Temp 98.2 °F (36.8 °C) (Oral)   Resp 16   Ht 5' 11\" (1.803 m)   Wt 258 lb 6.4 oz (117.2 kg)   SpO2 98%   BMI 36.04 kg/m²       Constitutional: He appears well nourished, of stated age, and dressed appropriately. Eyes: Sclera anicteric, PERRLA, EOMI  Neck: Supple without lymphadenopathy. Thyroid normal, No JVD or bruits  Respiratory: Clear to ascultation X5, normal inspiratory effort, no adventitious breath sounds. Cardiovascular: Regular rate and rhythm, no rubs or gallops, PMI not displaced, No thrills, no peripheral edema  Neuro: Non-focal exam, A & O X 3.   Psychiatric: Appropriate affect and demeanor, pleasant and cooperative. Patient's thought content and thought processing appear to be within normal limits. ASSESSMENT/PLAN:     ICD-10-CM ICD-9-CM    1. Essential hypertension  I10 401.9       2. Mixed hyperlipidemia  E78.2 272.2       3. Environmental and seasonal allergies  J30.89 477.8 fluticasone propionate (FLONASE) 50 mcg/actuation nasal spray      4. PND (post-nasal drip)  R09.82 784.91 fluticasone propionate (FLONASE) 50 mcg/actuation nasal spray      5. Dry cough  R05.8 786.2       6.  On statin therapy  Z79.899 V58.69         1: Patient to continue current regimen of blood pressure medication and cholesterol-lowering medication for management of essential hypertension and mixed hyperlipidemia. Patient tolerating these well. Blood pressures currently at goal.  2: Patient appears to be symptomatic of seasonal/environmental allergies, and may be compounded by history of COVID-19 infection. I will start him on Flonase nasal spray to take as directed. Have instructed patient to use over-the-counter Allegra 180 mg daily as well. Drink plenty of fluids. 3: Continue all other medications as prescribed. 4: Continue healthy lifestyle management and appropriate dietary intake. Weight loss recommended. 5: Patient to follow-up with me in approximately 4 months, or sooner as needed. Patient states understanding and agrees with plan. Orders Placed This Encounter    Dupixent Pen 300 mg/2 mL pnij    fluticasone propionate (FLONASE) 50 mcg/actuation nasal spray         ATTENTION:   This medical record was transcribed using an electronic medical records system. Although proofread, it may and can contain electronic and spelling errors. Other human spelling and other errors may be present. Corrections may be executed at a later time. Please feel free to contact us for any clarifications as needed. Follow-up and Dispositions    Return in about 4 months (around 4/6/2023) for Follow up with fasting labs. Signed,  Ova Mendoza.  Marcia Wheatley, MSN APRN FNP-BC

## 2022-12-26 DIAGNOSIS — I10 ESSENTIAL HYPERTENSION: ICD-10-CM

## 2022-12-26 RX ORDER — HYDROCHLOROTHIAZIDE 12.5 MG/1
TABLET ORAL
Qty: 90 TABLET | Refills: 1 | Status: SHIPPED | OUTPATIENT
Start: 2022-12-26

## 2023-01-06 ENCOUNTER — OFFICE VISIT (OUTPATIENT)
Dept: INTERNAL MEDICINE CLINIC | Age: 53
End: 2023-01-06
Payer: COMMERCIAL

## 2023-01-06 VITALS
WEIGHT: 262.4 LBS | HEIGHT: 71 IN | OXYGEN SATURATION: 97 % | TEMPERATURE: 98.2 F | HEART RATE: 54 BPM | SYSTOLIC BLOOD PRESSURE: 134 MMHG | BODY MASS INDEX: 36.73 KG/M2 | DIASTOLIC BLOOD PRESSURE: 82 MMHG | RESPIRATION RATE: 16 BRPM

## 2023-01-06 DIAGNOSIS — G47.9 SLEEP DISTURBANCE: ICD-10-CM

## 2023-01-06 DIAGNOSIS — F43.0 STRESS REACTION: Primary | ICD-10-CM

## 2023-01-06 DIAGNOSIS — F32.9 REACTIVE DEPRESSION: ICD-10-CM

## 2023-01-06 PROCEDURE — 99214 OFFICE O/P EST MOD 30 MIN: CPT | Performed by: NURSE PRACTITIONER

## 2023-01-06 PROCEDURE — 3075F SYST BP GE 130 - 139MM HG: CPT | Performed by: NURSE PRACTITIONER

## 2023-01-06 PROCEDURE — 3079F DIAST BP 80-89 MM HG: CPT | Performed by: NURSE PRACTITIONER

## 2023-01-06 RX ORDER — LORAZEPAM 0.5 MG/1
0.5 TABLET ORAL
Qty: 60 TABLET | Refills: 1 | Status: SHIPPED | OUTPATIENT
Start: 2023-01-06

## 2023-01-06 RX ORDER — BUPROPION HYDROCHLORIDE 150 MG/1
150 TABLET ORAL
Qty: 30 TABLET | Refills: 0 | Status: SHIPPED | OUTPATIENT
Start: 2023-01-06

## 2023-01-06 NOTE — PROGRESS NOTES
Chief Complaint   Patient presents with    Stress     Stressful issues going on for a while. SUBJECTIVE:    Andriy Salguero is a 45 y.o. male who is here today to discuss concerns regarding work stress and difficulty sleeping. 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 He is quite stressed about this, and having some difficulty coping with the concept of losing his position. However, he has been offered another position within his school board district in [place] which he will likely take. He states in the interim, he is looking at legal measures which she can take to protect himself and finish out the remainder of his term. He would like to discuss medication options to help him cope more effectively with this process. Current Outpatient Medications   Medication Sig Dispense Refill    buPROPion XL (WELLBUTRIN XL) 150 mg tablet Take 1 Tablet by mouth every morning. 30 Tablet 0    LORazepam (ATIVAN) 0.5 mg tablet Take 1 Tablet by mouth nightly as needed for Anxiety (SLEEP). Max Daily Amount: 0.5 mg. 60 Tablet 1    hydroCHLOROthiazide (HYDRODIURIL) 12.5 mg tablet TAKE ONE TABLET BY MOUTH ONE TIME DAILY FOR HIGH BLOOD PRESSURE 90 Tablet 1    Dupixent Pen 300 mg/2 mL pnij       fluticasone propionate (FLONASE) 50 mcg/actuation nasal spray 2 Sprays by Both Nostrils route daily. 1 Each 5    nebivoloL (BYSTOLIC) 10 mg tablet Take 1 Tablet by mouth in the morning. Indications: high blood pressure 90 Tablet 1    aspirin delayed-release 81 mg tablet Take 81 mg by mouth in the morning.      hydrOXYzine HCL (ATARAX) 25 mg tablet       ezetimibe (ZETIA) 10 mg tablet Take 10 mg by mouth.      losartan (COZAAR) 100 mg tablet Take 100 mg by mouth daily. pitavastatin calcium (LIVALO) 4 mg tab tablet Take 4 mg by mouth daily. History reviewed.  No pertinent past medical history. History reviewed. No pertinent surgical history. No Known Allergies    REVIEW OF SYSTEMS:                                        POSITIVE= bold text  Negative = regular text    General:                     fever, chills, sweats, generalized weakness, weight loss/gain,                                       loss of appetite   Eyes:                           blurred vision, eye pain, loss of vision, double vision  ENT:                            rhinorrhea, pharyngitis   Respiratory:               cough, sputum production, SOB, GRANADOS, wheezing, pleuritic pain   Cardiology:                chest pain, palpitations, orthopnea, PND, edema, syncope   Gastrointestinal:       abdominal pain , N/V, diarrhea, dysphagia, constipation, bleeding   Genitourinary:           frequency, urgency, dysuria, hematuria, incontinence   Muskuloskeletal :      arthralgia, myalgia, back pain  Hematology:              easy bruising, nose or gum bleeding, lymphadenopathy   Dermatological:         rash, ulceration, pruritis, color change / jaundice  Endocrine:                 hot flashes or polydipsia   Neurological:             headache, dizziness, confusion, focal weakness, paresthesia,                                      Speech difficulties, memory loss, gait difficulty  Psychological:          Feelings of anxiety, depression, agitation        Social History     Socioeconomic History    Marital status:    Tobacco Use    Smoking status: Never    Smokeless tobacco: Never   Vaping Use    Vaping Use: Never used   Substance and Sexual Activity    Alcohol use: Yes     Comment: occ     Social Determinants of Health     Financial Resource Strain: Low Risk     Difficulty of Paying Living Expenses: Not hard at all   Food Insecurity: No Food Insecurity    Worried About Running Out of Food in the Last Year: Never true    Ran Out of Food in the Last Year: Never true     History reviewed.  No pertinent family history. OBJECTIVE:     Visit Vitals  /82 (BP 1 Location: Left upper arm, BP Patient Position: Sitting, BP Cuff Size: Adult)   Pulse (!) 54   Temp 98.2 °F (36.8 °C) (Oral)   Resp 16   Ht 5' 11\" (1.803 m)   Wt 262 lb 6.4 oz (119 kg)   SpO2 97%   BMI 36.60 kg/m²       Constitutional: He appears well nourished, of stated age, and dressed appropriately. Eyes: Sclera anicteric, PERRLA, EOMI  Respiratory: Clear to ascultation X5, normal inspiratory effort, no adventitious breath sounds. Cardiovascular: Regular rate and rhythm, no murmurs, rubs or gallops, PMI not displaced, No thrills  Neuro: Non-focal exam, A & O X 3.  Psychiatric: Somewhat anxious affect and demeanor, but otherwise pleasant and cooperative. Patient's thought content and thought processing appear to be within normal limits. ASSESSMENT/PLAN:     ICD-10-CM ICD-9-CM    1. Stress reaction  F43.0 308.9 buPROPion XL (WELLBUTRIN XL) 150 mg tablet      2. Reactive depression  F32.9 300.4 buPROPion XL (WELLBUTRIN XL) 150 mg tablet      3. Sleep disturbance  G47.9 780.50 LORazepam (ATIVAN) 0.5 mg tablet        1: Patient will be started on Wellbutrin  mg daily as discussed. 2: Patient will be given lorazepam 0.5 mg to take nightly to help with stress/sleep issues. 3: Patient counseled on avoidance of alcohol use with benzodiazepine use. Patient acknowledges. 4: Patient advised to continue to focus on healthy dietary intake as well as regular exercise patterns. 5: Continue all other medications as directed. 6: Patient to follow-up with me in approximately 6 weeks for recheck or sooner as needed. Patient states understanding and agrees with plan. Orders Placed This Encounter    buPROPion XL (WELLBUTRIN XL) 150 mg tablet    LORazepam (ATIVAN) 0.5 mg tablet         ATTENTION:   This medical record was transcribed using an electronic medical records system. Although proofread, it may and can contain electronic and spelling errors. Other human spelling and other errors may be present. Corrections may be executed at a later time. Please feel free to contact us for any clarifications as needed. Follow-up and Dispositions    Return in about 6 weeks (around 2/17/2023) for Follow up  - no labs. Signed,  Guerrero Comer.  Osorio Robb, MSN APRN FNP-BC

## 2023-01-06 NOTE — PROGRESS NOTES
Susanna Driver is a 45 y.o. male     Chief Complaint   Patient presents with    Stress     Stressful issues going on for a while. Visit Vitals  /82 (BP 1 Location: Left upper arm, BP Patient Position: Sitting, BP Cuff Size: Adult)   Pulse (!) 54   Temp 98.2 °F (36.8 °C) (Oral)   Resp 16   Ht 5' 11\" (1.803 m)   Wt 262 lb 6.4 oz (119 kg)   SpO2 97%   BMI 36.60 kg/m²       Health Maintenance Due   Topic Date Due    DTaP/Tdap/Td series (1 - Tdap) Never done    Shingles Vaccine (1 of 2) Never done    COVID-19 Vaccine (5 - Booster for Pfizer series) 08/15/2022         1. \"Have you been to the ER, urgent care clinic since your last visit? Hospitalized since your last visit? \" No    2. \"Have you seen or consulted any other health care providers outside of the [address] [place] [address] since your last visit? \" No     3. For patients aged 39-70: Has the patient had a colonoscopy / FIT/ Cologuard? Yes - no Care Gap present      If the patient is female:    4. For patients aged 41-77: Has the patient had a mammogram within the past 2 years? NA - based on age or sex      11. For patients aged 21-65: Has the patient had a pap smear?  NA - based on age or sex

## 2023-02-06 ENCOUNTER — APPOINTMENT (OUTPATIENT)
Dept: CT IMAGING | Age: 53
DRG: 250 | End: 2023-02-06
Attending: STUDENT IN AN ORGANIZED HEALTH CARE EDUCATION/TRAINING PROGRAM
Payer: COMMERCIAL

## 2023-02-06 ENCOUNTER — APPOINTMENT (OUTPATIENT)
Dept: GENERAL RADIOLOGY | Age: 53
DRG: 250 | End: 2023-02-06
Attending: EMERGENCY MEDICINE
Payer: COMMERCIAL

## 2023-02-06 ENCOUNTER — APPOINTMENT (OUTPATIENT)
Dept: CT IMAGING | Age: 53
DRG: 250 | End: 2023-02-06
Attending: INTERNAL MEDICINE
Payer: COMMERCIAL

## 2023-02-06 ENCOUNTER — HOSPITAL ENCOUNTER (INPATIENT)
Age: 53
LOS: 1 days | Discharge: ANOTHER HOSPITAL | DRG: 250 | End: 2023-02-07
Attending: STUDENT IN AN ORGANIZED HEALTH CARE EDUCATION/TRAINING PROGRAM | Admitting: INTERNAL MEDICINE
Payer: COMMERCIAL

## 2023-02-06 DIAGNOSIS — I21.4 NSTEMI (NON-ST ELEVATED MYOCARDIAL INFARCTION) (HCC): Primary | ICD-10-CM

## 2023-02-06 DIAGNOSIS — R07.89 OTHER CHEST PAIN: ICD-10-CM

## 2023-02-06 LAB
ALBUMIN SERPL-MCNC: 3.9 G/DL (ref 3.5–5)
ALBUMIN/GLOB SERPL: 1.4 (ref 1.1–2.2)
ALP SERPL-CCNC: 65 U/L (ref 45–117)
ALT SERPL-CCNC: 32 U/L (ref 12–78)
ANION GAP SERPL CALC-SCNC: 7 MMOL/L (ref 5–15)
APTT PPP: 26.1 SEC (ref 21.2–34.1)
AST SERPL W P-5'-P-CCNC: 21 U/L (ref 15–37)
ATRIAL RATE: 56 BPM
BASOPHILS # BLD: 0 K/UL (ref 0–0.1)
BASOPHILS # BLD: 0.1 K/UL (ref 0–0.1)
BASOPHILS NFR BLD: 1 % (ref 0–1)
BASOPHILS NFR BLD: 1 % (ref 0–1)
BILIRUB SERPL-MCNC: 0.8 MG/DL (ref 0.2–1)
BUN SERPL-MCNC: 11 MG/DL (ref 6–20)
BUN/CREAT SERPL: 11 (ref 12–20)
CA-I BLD-MCNC: 9 MG/DL (ref 8.5–10.1)
CALCULATED P AXIS, ECG09: 26 DEGREES
CALCULATED R AXIS, ECG10: -16 DEGREES
CALCULATED T AXIS, ECG11: -2 DEGREES
CHLORIDE SERPL-SCNC: 107 MMOL/L (ref 97–108)
CO2 SERPL-SCNC: 27 MMOL/L (ref 21–32)
CREAT SERPL-MCNC: 1.01 MG/DL (ref 0.7–1.3)
DIAGNOSIS, 93000: NORMAL
DIFFERENTIAL METHOD BLD: ABNORMAL
DIFFERENTIAL METHOD BLD: ABNORMAL
EOSINOPHIL # BLD: 0.1 K/UL (ref 0–0.4)
EOSINOPHIL # BLD: 0.1 K/UL (ref 0–0.4)
EOSINOPHIL NFR BLD: 1 % (ref 0–7)
EOSINOPHIL NFR BLD: 2 % (ref 0–7)
ERYTHROCYTE [DISTWIDTH] IN BLOOD BY AUTOMATED COUNT: 12.5 % (ref 11.5–14.5)
ERYTHROCYTE [DISTWIDTH] IN BLOOD BY AUTOMATED COUNT: 12.6 % (ref 11.5–14.5)
GLOBULIN SER CALC-MCNC: 2.8 G/DL (ref 2–4)
GLUCOSE SERPL-MCNC: 101 MG/DL (ref 65–100)
HCT VFR BLD AUTO: 41.9 % (ref 36.6–50.3)
HCT VFR BLD AUTO: 42 % (ref 36.6–50.3)
HGB BLD-MCNC: 14.7 G/DL (ref 12.1–17)
HGB BLD-MCNC: 14.8 G/DL (ref 12.1–17)
IMM GRANULOCYTES # BLD AUTO: 0 K/UL (ref 0–0.04)
IMM GRANULOCYTES # BLD AUTO: 0 K/UL (ref 0–0.04)
IMM GRANULOCYTES NFR BLD AUTO: 0 % (ref 0–0.5)
IMM GRANULOCYTES NFR BLD AUTO: 0 % (ref 0–0.5)
LYMPHOCYTES # BLD: 1.3 K/UL (ref 0.8–3.5)
LYMPHOCYTES # BLD: 1.4 K/UL (ref 0.8–3.5)
LYMPHOCYTES NFR BLD: 21 % (ref 12–49)
LYMPHOCYTES NFR BLD: 23 % (ref 12–49)
MCH RBC QN AUTO: 30.4 PG (ref 26–34)
MCH RBC QN AUTO: 30.7 PG (ref 26–34)
MCHC RBC AUTO-ENTMCNC: 35 G/DL (ref 30–36.5)
MCHC RBC AUTO-ENTMCNC: 35.3 G/DL (ref 30–36.5)
MCV RBC AUTO: 86.8 FL (ref 80–99)
MCV RBC AUTO: 86.9 FL (ref 80–99)
MONOCYTES # BLD: 0.5 K/UL (ref 0–1)
MONOCYTES # BLD: 0.6 K/UL (ref 0–1)
MONOCYTES NFR BLD: 10 % (ref 5–13)
MONOCYTES NFR BLD: 8 % (ref 5–13)
NEUTS SEG # BLD: 3.8 K/UL (ref 1.8–8)
NEUTS SEG # BLD: 4.3 K/UL (ref 1.8–8)
NEUTS SEG NFR BLD: 64 % (ref 32–75)
NEUTS SEG NFR BLD: 69 % (ref 32–75)
NRBC # BLD: 0 K/UL (ref 0–0.01)
NRBC # BLD: 0 K/UL (ref 0–0.01)
NRBC BLD-RTO: 0 PER 100 WBC
NRBC BLD-RTO: 0 PER 100 WBC
P-R INTERVAL, ECG05: 180 MS
PLATELET # BLD AUTO: 125 K/UL (ref 150–400)
PLATELET # BLD AUTO: 139 K/UL (ref 150–400)
PMV BLD AUTO: 11.8 FL (ref 8.9–12.9)
PMV BLD AUTO: 11.9 FL (ref 8.9–12.9)
POTASSIUM SERPL-SCNC: 3.5 MMOL/L (ref 3.5–5.1)
PROT SERPL-MCNC: 6.7 G/DL (ref 6.4–8.2)
Q-T INTERVAL, ECG07: 438 MS
QRS DURATION, ECG06: 98 MS
QTC CALCULATION (BEZET), ECG08: 422 MS
RBC # BLD AUTO: 4.82 M/UL (ref 4.1–5.7)
RBC # BLD AUTO: 4.84 M/UL (ref 4.1–5.7)
SODIUM SERPL-SCNC: 141 MMOL/L (ref 136–145)
THERAPEUTIC RANGE,PTTT: NORMAL SEC (ref 82–109)
TROPONIN I SERPL HS-MCNC: 257 NG/L (ref 0–76)
TROPONIN I SERPL HS-MCNC: 263 NG/L (ref 0–76)
UFH PPP CHRO-ACNC: <0.1 IU/ML
UFH PPP CHRO-ACNC: <0.1 IU/ML
VENTRICULAR RATE, ECG03: 56 BPM
WBC # BLD AUTO: 5.9 K/UL (ref 4.1–11.1)
WBC # BLD AUTO: 6.3 K/UL (ref 4.1–11.1)

## 2023-02-06 PROCEDURE — 74011250637 HC RX REV CODE- 250/637: Performed by: INTERNAL MEDICINE

## 2023-02-06 PROCEDURE — 93005 ELECTROCARDIOGRAM TRACING: CPT

## 2023-02-06 PROCEDURE — 71045 X-RAY EXAM CHEST 1 VIEW: CPT

## 2023-02-06 PROCEDURE — 70450 CT HEAD/BRAIN W/O DYE: CPT

## 2023-02-06 PROCEDURE — 74011000250 HC RX REV CODE- 250: Performed by: INTERNAL MEDICINE

## 2023-02-06 PROCEDURE — 80053 COMPREHEN METABOLIC PANEL: CPT

## 2023-02-06 PROCEDURE — 36415 COLL VENOUS BLD VENIPUNCTURE: CPT

## 2023-02-06 PROCEDURE — 74011250636 HC RX REV CODE- 250/636: Performed by: INTERNAL MEDICINE

## 2023-02-06 PROCEDURE — 85520 HEPARIN ASSAY: CPT

## 2023-02-06 PROCEDURE — 85730 THROMBOPLASTIN TIME PARTIAL: CPT

## 2023-02-06 PROCEDURE — 65270000029 HC RM PRIVATE

## 2023-02-06 PROCEDURE — 84484 ASSAY OF TROPONIN QUANT: CPT

## 2023-02-06 PROCEDURE — 74011250636 HC RX REV CODE- 250/636: Performed by: STUDENT IN AN ORGANIZED HEALTH CARE EDUCATION/TRAINING PROGRAM

## 2023-02-06 PROCEDURE — 99285 EMERGENCY DEPT VISIT HI MDM: CPT

## 2023-02-06 PROCEDURE — 85025 COMPLETE CBC W/AUTO DIFF WBC: CPT

## 2023-02-06 RX ORDER — ACETAMINOPHEN 650 MG/1
650 SUPPOSITORY RECTAL
Status: DISCONTINUED | OUTPATIENT
Start: 2023-02-06 | End: 2023-02-08 | Stop reason: HOSPADM

## 2023-02-06 RX ORDER — ATENOLOL 25 MG/1
50 TABLET ORAL DAILY
Status: DISCONTINUED | OUTPATIENT
Start: 2023-02-06 | End: 2023-02-07

## 2023-02-06 RX ORDER — SODIUM CHLORIDE 0.9 % (FLUSH) 0.9 %
5-40 SYRINGE (ML) INJECTION AS NEEDED
Status: DISCONTINUED | OUTPATIENT
Start: 2023-02-06 | End: 2023-02-08 | Stop reason: HOSPADM

## 2023-02-06 RX ORDER — EZETIMIBE 10 MG/1
10 TABLET ORAL DAILY
Status: DISCONTINUED | OUTPATIENT
Start: 2023-02-07 | End: 2023-02-08 | Stop reason: HOSPADM

## 2023-02-06 RX ORDER — HEPARIN SODIUM 1000 [USP'U]/ML
4000 INJECTION, SOLUTION INTRAVENOUS; SUBCUTANEOUS ONCE
Status: COMPLETED | OUTPATIENT
Start: 2023-02-06 | End: 2023-02-06

## 2023-02-06 RX ORDER — ONDANSETRON 2 MG/ML
4 INJECTION INTRAMUSCULAR; INTRAVENOUS
Status: DISCONTINUED | OUTPATIENT
Start: 2023-02-06 | End: 2023-02-08 | Stop reason: HOSPADM

## 2023-02-06 RX ORDER — ASPIRIN 81 MG/1
81 TABLET ORAL DAILY
Status: DISCONTINUED | OUTPATIENT
Start: 2023-02-07 | End: 2023-02-08 | Stop reason: HOSPADM

## 2023-02-06 RX ORDER — HEPARIN SODIUM 10000 [USP'U]/100ML
12-25 INJECTION, SOLUTION INTRAVENOUS
Status: DISCONTINUED | OUTPATIENT
Start: 2023-02-06 | End: 2023-02-08 | Stop reason: HOSPADM

## 2023-02-06 RX ORDER — BUPROPION HYDROCHLORIDE 150 MG/1
150 TABLET, EXTENDED RELEASE ORAL DAILY
Status: DISCONTINUED | OUTPATIENT
Start: 2023-02-07 | End: 2023-02-08 | Stop reason: HOSPADM

## 2023-02-06 RX ORDER — ONDANSETRON 4 MG/1
4 TABLET, ORALLY DISINTEGRATING ORAL
Status: DISCONTINUED | OUTPATIENT
Start: 2023-02-06 | End: 2023-02-08 | Stop reason: HOSPADM

## 2023-02-06 RX ORDER — HYDROXYZINE HYDROCHLORIDE 10 MG/1
25 TABLET, FILM COATED ORAL
Status: DISCONTINUED | OUTPATIENT
Start: 2023-02-06 | End: 2023-02-08 | Stop reason: HOSPADM

## 2023-02-06 RX ORDER — ACETAMINOPHEN 325 MG/1
650 TABLET ORAL
Status: DISCONTINUED | OUTPATIENT
Start: 2023-02-06 | End: 2023-02-08 | Stop reason: HOSPADM

## 2023-02-06 RX ORDER — LOSARTAN POTASSIUM 50 MG/1
100 TABLET ORAL DAILY
Status: DISCONTINUED | OUTPATIENT
Start: 2023-02-06 | End: 2023-02-08 | Stop reason: HOSPADM

## 2023-02-06 RX ORDER — SODIUM CHLORIDE 0.9 % (FLUSH) 0.9 %
5-40 SYRINGE (ML) INJECTION EVERY 8 HOURS
Status: DISCONTINUED | OUTPATIENT
Start: 2023-02-06 | End: 2023-02-08 | Stop reason: HOSPADM

## 2023-02-06 RX ORDER — HEPARIN SODIUM 1000 [USP'U]/ML
4000 INJECTION, SOLUTION INTRAVENOUS; SUBCUTANEOUS AS NEEDED
Status: DISCONTINUED | OUTPATIENT
Start: 2023-02-06 | End: 2023-02-08 | Stop reason: HOSPADM

## 2023-02-06 RX ORDER — HEPARIN SODIUM 1000 [USP'U]/ML
2000 INJECTION, SOLUTION INTRAVENOUS; SUBCUTANEOUS AS NEEDED
Status: DISCONTINUED | OUTPATIENT
Start: 2023-02-06 | End: 2023-02-08 | Stop reason: HOSPADM

## 2023-02-06 RX ORDER — LANOLIN ALCOHOL/MO/W.PET/CERES
3 CREAM (GRAM) TOPICAL
Status: DISCONTINUED | OUTPATIENT
Start: 2023-02-06 | End: 2023-02-08 | Stop reason: HOSPADM

## 2023-02-06 RX ORDER — ACETAMINOPHEN 500 MG
1000 TABLET ORAL
Status: COMPLETED | OUTPATIENT
Start: 2023-02-06 | End: 2023-02-06

## 2023-02-06 RX ORDER — POLYETHYLENE GLYCOL 3350 17 G/17G
17 POWDER, FOR SOLUTION ORAL DAILY PRN
Status: DISCONTINUED | OUTPATIENT
Start: 2023-02-06 | End: 2023-02-08 | Stop reason: HOSPADM

## 2023-02-06 RX ORDER — FLUTICASONE PROPIONATE 50 MCG
2 SPRAY, SUSPENSION (ML) NASAL DAILY
Status: DISCONTINUED | OUTPATIENT
Start: 2023-02-07 | End: 2023-02-06

## 2023-02-06 RX ORDER — POTASSIUM CHLORIDE 20 MEQ/1
40 TABLET, EXTENDED RELEASE ORAL
Status: COMPLETED | OUTPATIENT
Start: 2023-02-06 | End: 2023-02-06

## 2023-02-06 RX ORDER — HYDROCHLOROTHIAZIDE 25 MG/1
12.5 TABLET ORAL DAILY
Status: DISCONTINUED | OUTPATIENT
Start: 2023-02-07 | End: 2023-02-08 | Stop reason: HOSPADM

## 2023-02-06 RX ORDER — ATORVASTATIN CALCIUM 20 MG/1
20 TABLET, FILM COATED ORAL
Status: DISCONTINUED | OUTPATIENT
Start: 2023-02-06 | End: 2023-02-08 | Stop reason: HOSPADM

## 2023-02-06 RX ADMIN — HEPARIN SODIUM 4000 UNITS: 1000 INJECTION, SOLUTION INTRAVENOUS; SUBCUTANEOUS at 23:14

## 2023-02-06 RX ADMIN — POTASSIUM CHLORIDE 40 MEQ: 1500 TABLET, EXTENDED RELEASE ORAL at 15:24

## 2023-02-06 RX ADMIN — ACETAMINOPHEN 1000 MG: 500 TABLET ORAL at 15:24

## 2023-02-06 RX ADMIN — SODIUM CHLORIDE, PRESERVATIVE FREE 10 ML: 5 INJECTION INTRAVENOUS at 15:54

## 2023-02-06 RX ADMIN — SODIUM CHLORIDE, PRESERVATIVE FREE 10 ML: 5 INJECTION INTRAVENOUS at 21:26

## 2023-02-06 RX ADMIN — SODIUM CHLORIDE 1000 ML: 9 INJECTION, SOLUTION INTRAVENOUS at 14:22

## 2023-02-06 RX ADMIN — TICAGRELOR 180 MG: 90 TABLET ORAL at 21:25

## 2023-02-06 RX ADMIN — HEPARIN SODIUM 4000 UNITS: 1000 INJECTION, SOLUTION INTRAVENOUS; SUBCUTANEOUS at 15:29

## 2023-02-06 RX ADMIN — MELATONIN TAB 3 MG 3 MG: 3 TAB at 23:33

## 2023-02-06 RX ADMIN — HEPARIN SODIUM 8 UNITS/KG/HR: 10000 INJECTION, SOLUTION INTRAVENOUS at 16:44

## 2023-02-06 NOTE — Clinical Note
Contrast Dose Calculator:   Patient's age: 45.   Patient's sex: Male. Patient weight (kg) = 112.5. Creatinine level (mg/dL) = 0.91. Creatinine clearance (mL/min): 148. Contrast concentration (mg/mL) = 370. MACD = 300 mL. Max Contrast dose per Creatinine Cl calculator = 333 mL.

## 2023-02-06 NOTE — ED PROVIDER NOTES
[facility] EMERGENCY DEPT  EMERGENCY DEPARTMENT HISTORY AND PHYSICAL EXAM      Date: 2/6/2023  Patient Name: Dahlia Teran  MRN: [id number]  [place]: 1970  Date of evaluation: 2/6/2023  Provider: Joss Evans MD   Note Started: 2:22 PM 2/6/23    HISTORY OF PRESENT ILLNESS     Chief Complaint   Patient presents with    Chest Pain       History Provided By: Patient    HPI: Dahlia Teran, 45 y.o. male with past medical history of 3 cardiac stents presenting to the ED for fleeting chest pain over the last 1 days, associated with numbness in the left arm headache and dry mouth today. The patient notes sudden onset of headache about 2 hours ago without any radiation to the jaw. Denies any nausea or vomiting. He was concerned about his heart given his history so came to the ED. PAST MEDICAL HISTORY   Past Medical History:  No past medical history on file. Past Surgical History:  No past surgical history on file. Family History:  No family history on file. Social History:  Social History     Tobacco Use    Smoking status: Never    Smokeless tobacco: Never   Vaping Use    Vaping Use: Never used   Substance Use Topics    Alcohol use: Yes     Comment: occ       Allergies:  No Known Allergies    PCP: Marge HERNANDEZ NP    Current Meds:   Previous Medications    ASPIRIN DELAYED-RELEASE 81 MG TABLET    Take 81 mg by mouth in the morning. BUPROPION XL (WELLBUTRIN XL) 150 MG TABLET    TAKE ONE TABLET BY MOUTH EVERY MORNING    DUPIXENT  MG/2 ML PNIJ        EZETIMIBE (ZETIA) 10 MG TABLET    Take 10 mg by mouth. FLUTICASONE PROPIONATE (FLONASE) 50 MCG/ACTUATION NASAL SPRAY    2 Sprays by Both Nostrils route daily. HYDROCHLOROTHIAZIDE (HYDRODIURIL) 12.5 MG TABLET    TAKE ONE TABLET BY MOUTH ONE TIME DAILY FOR HIGH BLOOD PRESSURE    HYDROXYZINE HCL (ATARAX) 25 MG TABLET        LORAZEPAM (ATIVAN) 0.5 MG TABLET    Take 1 Tablet by mouth nightly as needed for Anxiety (SLEEP).  Max Daily Amount: 0.5 mg. LOSARTAN (COZAAR) 100 MG TABLET    Take 100 mg by mouth daily. NEBIVOLOL (BYSTOLIC) 10 MG TABLET    TAKE ONE TABLET BY MOUTH EVERY MORNING FOR HIGH BLOOD PRESSURE    PITAVASTATIN CALCIUM (LIVALO) 4 MG TAB TABLET    Take 4 mg by mouth daily. REVIEW OF SYSTEMS   Review of Systems  Positives and Pertinent negatives as per HPI. PHYSICAL EXAM     ED Triage Vitals [02/06/23 1256]   ED Encounter Vitals Group      BP (!) 158/86      Pulse (Heart Rate) (!) 56      Resp Rate 16      Temp 98.7 °F (37.1 °C)      Temp src       O2 Sat (%) 96 %      Weight 248 lb      Height 5' 11\"      Physical Exam  Vitals and nursing note reviewed. Constitutional:       General: He is not in acute distress. Appearance: Normal appearance. HENT:      Head: Normocephalic. Nose: Nose normal.      Mouth/Throat:      Mouth: Mucous membranes are moist.   Eyes:      Conjunctiva/sclera: Conjunctivae normal.   Cardiovascular:      Rate and Rhythm: Normal rate. Pulses: Normal pulses. Pulmonary:      Effort: Pulmonary effort is normal.      Breath sounds: Normal breath sounds. Abdominal:      General: Abdomen is flat. Musculoskeletal:         General: No deformity. Cervical back: Neck supple. Skin:     General: Skin is warm. Neurological:      General: No focal deficit present. Mental Status: He is alert. Comments: Symmetrical face with bilateral sensation intact and equal.  Patient is able to blow out his cheeks without problem. He is able to smile equally and tongue protrudes in midline. Uvula midline. Patient is able to speak normally without dysarthria or aphasia. Patient answers questions appropriately. Upper extremity strength and sensation is 5 out of 5 without deficit. There is no abnormal tone or tremor. Patient does not have any pronator drift on exam.  Coordination is intact with finger-to-nose and heel-to-shin. There is no dysdiadochokinesia. Romberg is negative.   Patient has stable gait and is able to walk a distance without trouble. Psychiatric:         Mood and Affect: Mood normal.        ED COURSE and DIFFERENTIAL DIAGNOSIS/MDM   Records Reviewed (source and summary of external notes): Prior medical records    Vitals:    Vitals:    02/06/23 1256   BP: (!) 158/86   Pulse: (!) 56   Resp: 16   Temp: 98.7 °F (37.1 °C)   SpO2: 96%   Weight: 112.5 kg (248 lb)   Height: 5' 11\" (1.803 m)       CC/HPI Summary, DDx, ED Course, and Reassessment: Patient presents with Chest pain. While the spectrum of DDx includes ACS, Aortic dissection, PNA, PE, PTX, pericarditis, myocarditis, GERD, costochondritis, anxiety, most concerned for ACS given the HPI, Physical exam and Heart Score. The others are less likely. Will obtain labs, CXR, EKG and depending on these results, get Cardiology Consult PRN. Composition of the HEART score for chest pain, angina, NSTEMI in the ED. HEART score criteria             Score  History: Highly suspicious    2    Moderately suspicious   1    Slightly or non-suspicious   0    ECG:  Significant ST depression   2    Nonspecific repolarisation disturbance 1    Normal      0    Age:  > or = 65 years    2    > 45 to < 65 years    1    < Or = to 45 years    0    Risk factors: > or = to 3 risk factors or history of CAD 2    1 or 2 risk factors    1    No risk factors known    0    Troponin: > or = to 3x normal limit   2    > 1 to < 3x normal limit   1    < or = to normal limit    0    Calculated Total : __6____         0-3: 0.9-1.7% risk of adverse cardiac event. In the HEART Score, these patients were  discharged. 4-6: 12-16.6% risk of adverse cardiac event. In the HEART Score, these patients were  admitted to the hospital.   =7: 50-65% risk of adverse cardiac event. In the HEART Score, these patients were  candidates for early invasive measures. Troponin returned elevated to 63.   Order for CT head was placed prior to troponin result given concern for headache but will cancel, believe symptoms more likely related to heart. Discussed with Dr. Tina Cordon they will start heparin drip. Hospitalist accepted. ED Course as of 02/06/23 1425   Mon Feb 06, 2023   1416 Patient reports he took 325 of aspirin prior to arrival [PC]      ED Course User Index  [PC] Ortega Bauer MD        EKG: Initial EKG interpreted by me. Shows this bradycardia at 56 bpm with no acute ischemic changes, normal intervals, leftward axis. Patient was given the following medications:  Medications   sodium chloride 0.9 % bolus infusion 1,000 mL (1,000 mL IntraVENous New Bag 2/6/23 1422)   acetaminophen (TYLENOL) tablet 1,000 mg (has no administration in time range)   heparin 25,000 units in D5W 250 ml infusion (has no administration in time range)   heparin (porcine) 1,000 unit/mL injection 4,000 Units (has no administration in time range)       CONSULTS: (Who and What was discussed)  IP CONSULT TO CARDIOLOGY     Chronic Conditions: CAD  Social Determinants affecting Dx or Tx: None  Counseling:      Disposition Considerations (Tests not done, Shared Decision Making, Pt Expectation of Test or Treatment.):      SCREENINGS               No data recorded        LAB, EKG AND DIAGNOSTIC RESULTS   Labs:  Recent Results (from the past 12 hour(s))   CBC WITH AUTOMATED DIFF    Collection Time: 02/06/23  1:08 PM   Result Value Ref Range    WBC 5.9 4.1 - 11.1 K/uL    RBC 4.82 4.10 - 5.70 M/uL    HGB 14.8 12.1 - 17.0 g/dL    HCT 41.9 36.6 - 50.3 %    MCV 86.9 80.0 - 99.0 FL    MCH 30.7 26.0 - 34.0 PG    MCHC 35.3 30.0 - 36.5 g/dL    RDW 12.6 11.5 - 14.5 %    PLATELET 563 (L) 481 - 400 K/uL    MPV 11.8 8.9 - 12.9 FL    NRBC 0.0 0.0  WBC    ABSOLUTE NRBC 0.00 0.00 - 0.01 K/uL    NEUTROPHILS 64 32 - 75 %    LYMPHOCYTES 23 12 - 49 %    MONOCYTES 10 5 - 13 %    EOSINOPHILS 2 0 - 7 %    BASOPHILS 1 0 - 1 %    IMMATURE GRANULOCYTES 0 0 - 0.5 %    ABS. NEUTROPHILS 3.8 1.8 - 8.0 K/UL    ABS.  LYMPHOCYTES 1.4 0.8 - 3.5 K/UL    ABS. MONOCYTES 0.6 0.0 - 1.0 K/UL    ABS. EOSINOPHILS 0.1 0.0 - 0.4 K/UL    ABS. BASOPHILS 0.1 0.0 - 0.1 K/UL    ABS. IMM. GRANS. 0.0 0.00 - 0.04 K/UL    DF AUTOMATED     METABOLIC PANEL, COMPREHENSIVE    Collection Time: 02/06/23  1:08 PM   Result Value Ref Range    Sodium 141 136 - 145 mmol/L    Potassium 3.5 3.5 - 5.1 mmol/L    Chloride 107 97 - 108 mmol/L    CO2 27 21 - 32 mmol/L    Anion gap 7 5 - 15 mmol/L    Glucose 101 (H) 65 - 100 mg/dL    BUN 11 6 - 20 mg/dL    Creatinine 1.01 0.70 - 1.30 mg/dL    BUN/Creatinine ratio 11 (L) 12 - 20      eGFR >60 >60 ml/min/1.73m2    Calcium 9.0 8.5 - 10.1 mg/dL    Bilirubin, total 0.8 0.2 - 1.0 mg/dL    AST (SGOT) 21 15 - 37 U/L    ALT (SGPT) 32 12 - 78 U/L    Alk. phosphatase 65 45 - 117 U/L    Protein, total 6.7 6.4 - 8.2 g/dL    Albumin 3.9 3.5 - 5.0 g/dL    Globulin 2.8 2.0 - 4.0 g/dL    A-G Ratio 1.4 1.1 - 2.2     TROPONIN-HIGH SENSITIVITY    Collection Time: 02/06/23  1:08 PM   Result Value Ref Range    Troponin-High Sensitivity 263 (HH) 0 - 76 ng/L       Radiologic Studies:  Non-plain film images such as CT, Ultrasound and MRI are read by the radiologist. Plain radiographic images are visualized and preliminarily interpreted by the ED Provider with the below findings:      Interpretation per the Radiologist below, if available at the time of this note:  XR CHEST PORT    Result Date: 2/6/2023  EXAM:  XR CHEST PORT INDICATION: Chest pain COMPARISON: none TECHNIQUE: Upright portable chest AP view FINDINGS: The cardiac silhouette is within normal limits. The pulmonary vasculature is within normal limits. The lungs and pleural spaces are clear. The visualized bones and upper abdomen are age-appropriate. No acute process on portable chest.       PROCEDURES   Unless otherwise noted below, none.   Performed by: Jatin Petty MD   Procedures      CRITICAL CARE TIME       FINAL IMPRESSION     1. NSTEMI (non-ST elevated myocardial infarction) ([facility] [address] DISPOSITION/PLAN   Admitted    Admit Note: Pt is being admitted by hospitalist. The results of their tests and reason(s) for their admission have been discussed with pt and/or available family. They convey agreement and understanding for the need to be admitted and for the admission diagnosis. PATIENT REFERRED TO:  Follow-up Information    None           DISCHARGE MEDICATIONS:  Current Discharge Medication List            DISCONTINUED MEDICATIONS:  Current Discharge Medication List          I am the Primary Clinician of Record: John Chambers MD (electronically signed)    (Please note that parts of this dictation were completed with voice recognition software. Quite often unanticipated grammatical, syntax, homophones, and other interpretive errors are inadvertently transcribed by the computer software. Please disregards these errors.  Please excuse any errors that have escaped final proofreading.)

## 2023-02-06 NOTE — PROGRESS NOTES
Reason for Admission:  NSTEMI                     RUR Score:  N/A                   Plan for utilizing home health:   None @ this time/uses no DME. PCP: First and Last name:  Monalisa Hamilton NP     Name of Practice:    Are you a current patient: Yes/No: Yes   Approximate date of last visit: Seen a month ago. Can you participate in a virtual visit with your PCP: Yes/Call/Has cell phone. Current Advanced Directive/Advance Care Plan: Full Code      Healthcare Decision Maker:              Primary Decision Maker: Jesus Harp - [phone]                  Transition of Care Plan:                    D/C Plan is home with wife & wife to transport. Send R[facility] to [facility] in [place] upon discharge.

## 2023-02-06 NOTE — H&P
GENERAL GENERIC H&P/CONSULT  Presenting complaint:Elevated troponin/Left arm numbness/tingling    Subjective: 38-year-old male with past medical history of multiple comorbidities presents to [facility] with complaints of headache as well as left-sided numbness/tingling, the symptoms have since resolved however the symptoms were similar to his prior events of a non-ST elevation MI. Patient denies any fever chills nausea vomiting lightheadedness dizziness dyspnea diaphoresis nocturnal dyspnea chest pain palpitations headache focal weakness auditory or visual symptoms abdominal stool or urinary complaints or any other associated symptoms. Patient endorses no recent sick contacts or travel activity    No past medical history on file. HTN/Alergic rhinitis/HLD/Depression/Anxiety  No past surgical history on file. PCI s/p 3 stents  Prior to Admission medications    Medication Sig Start Date End Date Taking? Authorizing Provider   hydroCHLOROthiazide (HYDRODIURIL) 12.5 mg tablet TAKE ONE TABLET BY MOUTH ONE TIME DAILY FOR HIGH BLOOD PRESSURE 1/30/23   Juliana Heck D, NP   nebivoloL (BYSTOLIC) 10 mg tablet TAKE ONE TABLET BY MOUTH EVERY MORNING FOR HIGH BLOOD PRESSURE 1/30/23   Juliana Heck D, NP   buPROPion XL (WELLBUTRIN XL) 150 mg tablet TAKE ONE TABLET BY MOUTH EVERY MORNING 1/30/23   Juliana Heck D, NP   LORazepam (ATIVAN) 0.5 mg tablet Take 1 Tablet by mouth nightly as needed for Anxiety (SLEEP). Max Daily Amount: 0.5 mg. 1/6/23   Juliana Heck D, NP   Dupixent Pen 300 mg/2 mL pnij  11/20/22   Provider, Historical   fluticasone propionate (FLONASE) 50 mcg/actuation nasal spray 2 Sprays by Both Nostrils route daily. 12/6/22   Juliana Heck D, NP   aspirin delayed-release 81 mg tablet Take 81 mg by mouth in the morning. Provider, Historical   hydrOXYzine HCL (ATARAX) 25 mg tablet  7/31/22   Provider, Historical   ezetimibe (ZETIA) 10 mg tablet Take 10 mg by mouth.     Provider, Historical   losartan (COZAAR) 100 mg tablet Take 100 mg by mouth daily. Provider, Historical   pitavastatin calcium (LIVALO) 4 mg tab tablet Take 4 mg by mouth daily. Provider, Historical     No Known Allergies   Social History     Tobacco Use    Smoking status: Never    Smokeless tobacco: Never   Substance Use Topics    Alcohol use: Yes     Comment: occ      No family history on file. Review of Systems   Constitutional:  Positive for activity change, appetite change and fatigue. Negative for chills, diaphoresis, fever and unexpected weight change. HENT:  Negative for congestion, dental problem, drooling, ear discharge, ear pain, facial swelling, hearing loss, mouth sores, nosebleeds, postnasal drip, rhinorrhea, sinus pressure, sinus pain, sneezing, sore throat, tinnitus, trouble swallowing and voice change. Eyes:  Negative for photophobia, pain, discharge, redness, itching and visual disturbance. Respiratory:  Negative for apnea, cough, choking, chest tightness, shortness of breath, wheezing and stridor. Cardiovascular:  Negative for chest pain, palpitations and leg swelling. Gastrointestinal:  Negative for abdominal distention, abdominal pain, anal bleeding, blood in stool, constipation, diarrhea, nausea, rectal pain and vomiting. Endocrine: Negative for cold intolerance, heat intolerance, polydipsia, polyphagia and polyuria. Genitourinary:  Negative for decreased urine volume, difficulty urinating, dysuria, enuresis, flank pain, frequency, genital sores, hematuria, penile discharge, penile pain, penile swelling, scrotal swelling, testicular pain and urgency. Musculoskeletal:  Negative for arthralgias, back pain, gait problem, joint swelling, myalgias, neck pain and neck stiffness. Skin:  Negative for color change, pallor, rash and wound. Allergic/Immunologic: Negative for environmental allergies, food allergies and immunocompromised state. Neurological:  Positive for numbness.  Negative for dizziness, tremors, seizures, syncope, facial asymmetry, speech difficulty, weakness, light-headedness and headaches. Hematological:  Negative for adenopathy. Does not bruise/bleed easily. Psychiatric/Behavioral:  Negative for agitation, behavioral problems, confusion, decreased concentration, dysphoric mood, hallucinations, self-injury, sleep disturbance and suicidal ideas. The patient is not nervous/anxious and is not hyperactive. Objective:    No intake/output data recorded. No intake/output data recorded. Patient Vitals for the past 8 hrs:   BP Temp Pulse Resp SpO2 Height Weight   02/06/23 1256 (!) 158/86 98.7 °F (37.1 °C) (!) 56 16 96 % 5' 11\" (1.803 m) 112.5 kg (248 lb)     Physical Exam  Vitals reviewed. Constitutional:       General: He is not in acute distress. Appearance: Normal appearance. He is normal weight. He is not ill-appearing, toxic-appearing or diaphoretic. HENT:      Head: Normocephalic and atraumatic. Nose: Nose normal. No congestion or rhinorrhea. Mouth/Throat:      Mouth: Mucous membranes are moist.      Pharynx: Oropharynx is clear. No oropharyngeal exudate or posterior oropharyngeal erythema. Eyes:      General: No scleral icterus. Right eye: No discharge. Left eye: No discharge. Extraocular Movements: Extraocular movements intact. Conjunctiva/sclera: Conjunctivae normal.      Pupils: Pupils are equal, round, and reactive to light. Neck:      Vascular: No carotid bruit. Cardiovascular:      Rate and Rhythm: Normal rate and regular rhythm. Pulses: Normal pulses. Heart sounds: Normal heart sounds. No murmur heard. No friction rub. No gallop. Pulmonary:      Effort: Pulmonary effort is normal. No respiratory distress. Breath sounds: Normal breath sounds. No stridor. No wheezing, rhonchi or rales. Chest:      Chest wall: No tenderness. Abdominal:      General: Abdomen is flat.  Bowel sounds are normal. There is no distension. Palpations: Abdomen is soft. There is no mass. Tenderness: There is no abdominal tenderness. There is no right CVA tenderness, left CVA tenderness, guarding or rebound. Hernia: No hernia is present. Musculoskeletal:         General: No swelling, tenderness, deformity or signs of injury. Normal range of motion. Cervical back: Normal range of motion and neck supple. No rigidity or tenderness. Right lower leg: No edema. Left lower leg: No edema. Lymphadenopathy:      Cervical: No cervical adenopathy. Skin:     General: Skin is warm. Capillary Refill: Capillary refill takes less than 2 seconds. Coloration: Skin is not jaundiced or pale. Findings: No bruising, erythema, lesion or rash. Neurological:      General: No focal deficit present. Mental Status: He is alert and oriented to person, place, and time. Mental status is at baseline. Cranial Nerves: No cranial nerve deficit. Sensory: No sensory deficit. Motor: No weakness. Coordination: Coordination normal.      Gait: Gait normal.      Deep Tendon Reflexes: Reflexes normal.   Psychiatric:         Mood and Affect: Mood normal.         Behavior: Behavior normal.         Thought Content:  Thought content normal.         Judgment: Judgment normal.        Labs:    Recent Results (from the past 24 hour(s))   CBC WITH AUTOMATED DIFF    Collection Time: 02/06/23  1:08 PM   Result Value Ref Range    WBC 5.9 4.1 - 11.1 K/uL    RBC 4.82 4.10 - 5.70 M/uL    HGB 14.8 12.1 - 17.0 g/dL    HCT 41.9 36.6 - 50.3 %    MCV 86.9 80.0 - 99.0 FL    MCH 30.7 26.0 - 34.0 PG    MCHC 35.3 30.0 - 36.5 g/dL    RDW 12.6 11.5 - 14.5 %    PLATELET 262 (L) 661 - 400 K/uL    MPV 11.8 8.9 - 12.9 FL    NRBC 0.0 0.0  WBC    ABSOLUTE NRBC 0.00 0.00 - 0.01 K/uL    NEUTROPHILS 64 32 - 75 %    LYMPHOCYTES 23 12 - 49 %    MONOCYTES 10 5 - 13 %    EOSINOPHILS 2 0 - 7 %    BASOPHILS 1 0 - 1 %    IMMATURE GRANULOCYTES 0 0 - 0.5 %    ABS. NEUTROPHILS 3.8 1.8 - 8.0 K/UL    ABS. LYMPHOCYTES 1.4 0.8 - 3.5 K/UL    ABS. MONOCYTES 0.6 0.0 - 1.0 K/UL    ABS. EOSINOPHILS 0.1 0.0 - 0.4 K/UL    ABS. BASOPHILS 0.1 0.0 - 0.1 K/UL    ABS. IMM. GRANS. 0.0 0.00 - 0.04 K/UL    DF AUTOMATED     METABOLIC PANEL, COMPREHENSIVE    Collection Time: 02/06/23  1:08 PM   Result Value Ref Range    Sodium 141 136 - 145 mmol/L    Potassium 3.5 3.5 - 5.1 mmol/L    Chloride 107 97 - 108 mmol/L    CO2 27 21 - 32 mmol/L    Anion gap 7 5 - 15 mmol/L    Glucose 101 (H) 65 - 100 mg/dL    BUN 11 6 - 20 mg/dL    Creatinine 1.01 0.70 - 1.30 mg/dL    BUN/Creatinine ratio 11 (L) 12 - 20      eGFR >60 >60 ml/min/1.73m2    Calcium 9.0 8.5 - 10.1 mg/dL    Bilirubin, total 0.8 0.2 - 1.0 mg/dL    AST (SGOT) 21 15 - 37 U/L    ALT (SGPT) 32 12 - 78 U/L    Alk.  phosphatase 65 45 - 117 U/L    Protein, total 6.7 6.4 - 8.2 g/dL    Albumin 3.9 3.5 - 5.0 g/dL    Globulin 2.8 2.0 - 4.0 g/dL    A-G Ratio 1.4 1.1 - 2.2     TROPONIN-HIGH SENSITIVITY    Collection Time: 02/06/23  1:08 PM   Result Value Ref Range    Troponin-High Sensitivity 263 (HH) 0 - 76 ng/L       ECG: nonspecific ST and T waves changes   Chest X-Ray: IMPRESSION     No acute process on portable chest    Assessment:  Active Problems:    NSTEMI (non-ST elevated myocardial infarction) ([facility] [address] (2/6/2023)        Plan:    Non-ST elevation MI-patient presents with above-mentioned symptomatology and based on patient clinical presentation as well as physical examination in conjunction with elevated serum troponin, consistent with non-ST elevation MI, patient mitchell hemodynamically stable  Aspirin once daily  Heparin GTT for 48 hours of anticoagulation  Trend cardiac enzymes  Obtain transthoracic echo  Cardiology consult    Hypokalemia-potassium recheck potassium    Hypertension-continue home antihypertensive medications    Hyperlipidemia-continue statin    Prophylaxis-heparin GTT  FEN-cardiac diet, replete potassium and magnesium  Full code, will clarify about surrogate decision-maker, admitted to telemetry further management    Signed:   Hans Murcia MD 2/6/2023

## 2023-02-06 NOTE — Clinical Note
TRANSFER - OUT REPORT:     Verbal report given to: Missy CORNOEL, (at bedside). Report consisted of patient's Situation, Background, Assessment and   Recommendations(SBAR). Opportunity for questions and clarification was provided. Patient transported with a Registered Nurse. Patient transported to: PACU.

## 2023-02-06 NOTE — ACP (ADVANCE CARE PLANNING)
Advance Care Planning   Healthcare Decision Maker:       Primary Decision Maker: Earlene Carvajal [facility] - [phone]

## 2023-02-07 ENCOUNTER — APPOINTMENT (OUTPATIENT)
Dept: NON INVASIVE DIAGNOSTICS | Age: 53
DRG: 250 | End: 2023-02-07
Attending: INTERNAL MEDICINE
Payer: COMMERCIAL

## 2023-02-07 VITALS
HEART RATE: 60 BPM | OXYGEN SATURATION: 97 % | DIASTOLIC BLOOD PRESSURE: 80 MMHG | BODY MASS INDEX: 34.72 KG/M2 | TEMPERATURE: 98.1 F | SYSTOLIC BLOOD PRESSURE: 128 MMHG | WEIGHT: 248 LBS | RESPIRATION RATE: 18 BRPM | HEIGHT: 71 IN

## 2023-02-07 LAB
ACT BLD: 101 SEC (ref 74–125)
ACT BLD: 233 SEC (ref 74–125)
ANION GAP SERPL CALC-SCNC: 5 MMOL/L (ref 5–15)
APTT PPP: 53.2 SEC (ref 21.2–34.1)
BASOPHILS # BLD: 0.1 K/UL (ref 0–0.1)
BASOPHILS NFR BLD: 1 % (ref 0–1)
BUN SERPL-MCNC: 12 MG/DL (ref 6–20)
BUN/CREAT SERPL: 13 (ref 12–20)
CA-I BLD-MCNC: 8.8 MG/DL (ref 8.5–10.1)
CHLORIDE SERPL-SCNC: 110 MMOL/L (ref 97–108)
CHOLEST SERPL-MCNC: 108 MG/DL
CO2 SERPL-SCNC: 25 MMOL/L (ref 21–32)
CREAT SERPL-MCNC: 0.91 MG/DL (ref 0.7–1.3)
DIFFERENTIAL METHOD BLD: ABNORMAL
ECHO AO ASC DIAM: 3.5 CM
ECHO AO ASCENDING AORTA INDEX: 1.52 CM/M2
ECHO AO ROOT DIAM: 3.6 CM
ECHO AO ROOT INDEX: 1.56 CM/M2
ECHO AV PEAK GRADIENT: 6 MMHG
ECHO AV PEAK VELOCITY: 1.3 M/S
ECHO AV VELOCITY RATIO: 0.77
ECHO LA AREA 2C: 23.7 CM2
ECHO LA AREA 4C: 19.7 CM2
ECHO LA DIAMETER INDEX: 1.73 CM/M2
ECHO LA DIAMETER: 4 CM
ECHO LA MAJOR AXIS: 5.9 CM
ECHO LA MINOR AXIS: 5.8 CM
ECHO LA TO AORTIC ROOT RATIO: 1.11
ECHO LA VOL BP: 67 ML (ref 18–58)
ECHO LA VOL/BSA BIPLANE: 29 ML/M2 (ref 16–34)
ECHO LV E' LATERAL VELOCITY: 14 CM/S
ECHO LV E' SEPTAL VELOCITY: 7 CM/S
ECHO LV EDV A2C: 86 ML
ECHO LV EDV A4C: 133 ML
ECHO LV EDV INDEX A4C: 58 ML/M2
ECHO LV EDV NDEX A2C: 37 ML/M2
ECHO LV EJECTION FRACTION A2C: 55 %
ECHO LV EJECTION FRACTION A4C: 63 %
ECHO LV EJECTION FRACTION BIPLANE: 60 % (ref 55–100)
ECHO LV ESV A2C: 39 ML
ECHO LV ESV A4C: 50 ML
ECHO LV ESV INDEX A2C: 17 ML/M2
ECHO LV ESV INDEX A4C: 22 ML/M2
ECHO LV FRACTIONAL SHORTENING: 29 % (ref 28–44)
ECHO LV INTERNAL DIMENSION DIASTOLE INDEX: 1.95 CM/M2
ECHO LV INTERNAL DIMENSION DIASTOLIC: 4.5 CM (ref 4.2–5.9)
ECHO LV INTERNAL DIMENSION SYSTOLIC INDEX: 1.39 CM/M2
ECHO LV INTERNAL DIMENSION SYSTOLIC: 3.2 CM
ECHO LV IVSD: 1.3 CM (ref 0.6–1)
ECHO LV MASS 2D: 210.2 G (ref 88–224)
ECHO LV MASS INDEX 2D: 91 G/M2 (ref 49–115)
ECHO LV POSTERIOR WALL DIASTOLIC: 1.2 CM (ref 0.6–1)
ECHO LV RELATIVE WALL THICKNESS RATIO: 0.53
ECHO LVOT PEAK GRADIENT: 4 MMHG
ECHO LVOT PEAK VELOCITY: 1 M/S
ECHO MV A VELOCITY: 0.54 M/S
ECHO MV E DECELERATION TIME (DT): 314 MS
ECHO MV E VELOCITY: 0.63 M/S
ECHO MV E/A RATIO: 1.17
ECHO MV E/E' LATERAL: 4.5
ECHO MV E/E' RATIO (AVERAGED): 6.75
ECHO MV E/E' SEPTAL: 9
ECHO PV MAX VELOCITY: 0.6 M/S
ECHO PV PEAK GRADIENT: 2 MMHG
ECHO RA AREA 4C: 16.7 CM2
ECHO RA END SYSTOLIC VOLUME APICAL 4 CHAMBER INDEX BSA: 19 ML/M2
ECHO RA VOLUME: 45 ML
ECHO RV BASAL DIMENSION: 3.8 CM
ECHO RV TAPSE: 2.1 CM (ref 1.7–?)
EOSINOPHIL # BLD: 0.2 K/UL (ref 0–0.4)
EOSINOPHIL NFR BLD: 3 % (ref 0–7)
ERYTHROCYTE [DISTWIDTH] IN BLOOD BY AUTOMATED COUNT: 12.9 % (ref 11.5–14.5)
GLUCOSE SERPL-MCNC: 99 MG/DL (ref 65–100)
HCT VFR BLD AUTO: 41 % (ref 36.6–50.3)
HDLC SERPL-MCNC: 45 MG/DL
HDLC SERPL: 2.4 (ref 0–5)
HGB BLD-MCNC: 14.2 G/DL (ref 12.1–17)
IMM GRANULOCYTES # BLD AUTO: 0 K/UL (ref 0–0.04)
IMM GRANULOCYTES NFR BLD AUTO: 0 % (ref 0–0.5)
LDLC SERPL CALC-MCNC: 47.2 MG/DL (ref 0–100)
LIPID PROFILE,FLP: NORMAL
LYMPHOCYTES # BLD: 1.8 K/UL (ref 0.8–3.5)
LYMPHOCYTES NFR BLD: 31 % (ref 12–49)
MAGNESIUM SERPL-MCNC: 2.2 MG/DL (ref 1.6–2.4)
MCH RBC QN AUTO: 30.5 PG (ref 26–34)
MCHC RBC AUTO-ENTMCNC: 34.6 G/DL (ref 30–36.5)
MCV RBC AUTO: 88 FL (ref 80–99)
MONOCYTES # BLD: 0.5 K/UL (ref 0–1)
MONOCYTES NFR BLD: 9 % (ref 5–13)
NEUTS SEG # BLD: 3.2 K/UL (ref 1.8–8)
NEUTS SEG NFR BLD: 56 % (ref 32–75)
NRBC # BLD: 0 K/UL (ref 0–0.01)
NRBC BLD-RTO: 0 PER 100 WBC
PERFORMED BY, TECHID: ABNORMAL
PERFORMED BY, TECHID: NORMAL
PLATELET # BLD AUTO: 127 K/UL (ref 150–400)
PMV BLD AUTO: 12.2 FL (ref 8.9–12.9)
POTASSIUM SERPL-SCNC: 3.4 MMOL/L (ref 3.5–5.1)
RBC # BLD AUTO: 4.66 M/UL (ref 4.1–5.7)
SODIUM SERPL-SCNC: 140 MMOL/L (ref 136–145)
THERAPEUTIC RANGE,PTTT: ABNORMAL SEC (ref 82–109)
TRIGL SERPL-MCNC: 79 MG/DL (ref ?–150)
TROPONIN I SERPL HS-MCNC: 224 NG/L (ref 0–76)
TROPONIN I SERPL HS-MCNC: 236 NG/L (ref 0–76)
UFH PPP CHRO-ACNC: 0.28 IU/ML
UFH PPP CHRO-ACNC: 0.31 IU/ML
UFH PPP CHRO-ACNC: 0.58 IU/ML
VLDLC SERPL CALC-MCNC: 15.8 MG/DL
WBC # BLD AUTO: 5.7 K/UL (ref 4.1–11.1)

## 2023-02-07 PROCEDURE — 74011250637 HC RX REV CODE- 250/637: Performed by: INTERNAL MEDICINE

## 2023-02-07 PROCEDURE — C1769 GUIDE WIRE: HCPCS | Performed by: INTERNAL MEDICINE

## 2023-02-07 PROCEDURE — C1887 CATHETER, GUIDING: HCPCS | Performed by: INTERNAL MEDICINE

## 2023-02-07 PROCEDURE — 85730 THROMBOPLASTIN TIME PARTIAL: CPT

## 2023-02-07 PROCEDURE — 77030008542 HC TBNG MON PRSS EDWD -A: Performed by: INTERNAL MEDICINE

## 2023-02-07 PROCEDURE — B2111ZZ FLUOROSCOPY OF MULTIPLE CORONARY ARTERIES USING LOW OSMOLAR CONTRAST: ICD-10-PCS | Performed by: INTERNAL MEDICINE

## 2023-02-07 PROCEDURE — 36415 COLL VENOUS BLD VENIPUNCTURE: CPT

## 2023-02-07 PROCEDURE — 74011000636 HC RX REV CODE- 636: Performed by: INTERNAL MEDICINE

## 2023-02-07 PROCEDURE — 85347 COAGULATION TIME ACTIVATED: CPT

## 2023-02-07 PROCEDURE — C1894 INTRO/SHEATH, NON-LASER: HCPCS | Performed by: INTERNAL MEDICINE

## 2023-02-07 PROCEDURE — 4A023N7 MEASUREMENT OF CARDIAC SAMPLING AND PRESSURE, LEFT HEART, PERCUTANEOUS APPROACH: ICD-10-PCS | Performed by: INTERNAL MEDICINE

## 2023-02-07 PROCEDURE — 85520 HEPARIN ASSAY: CPT

## 2023-02-07 PROCEDURE — 74011000250 HC RX REV CODE- 250: Performed by: INTERNAL MEDICINE

## 2023-02-07 PROCEDURE — 77030013516 HC DEV INFL ANGI MRTM -B: Performed by: INTERNAL MEDICINE

## 2023-02-07 PROCEDURE — 76210000004 HC OR PH I REC 4.5 TO 5 HR: Performed by: INTERNAL MEDICINE

## 2023-02-07 PROCEDURE — 99152 MOD SED SAME PHYS/QHP 5/>YRS: CPT | Performed by: INTERNAL MEDICINE

## 2023-02-07 PROCEDURE — 80061 LIPID PANEL: CPT

## 2023-02-07 PROCEDURE — C1760 CLOSURE DEV, VASC: HCPCS | Performed by: INTERNAL MEDICINE

## 2023-02-07 PROCEDURE — 93458 L HRT ARTERY/VENTRICLE ANGIO: CPT | Performed by: INTERNAL MEDICINE

## 2023-02-07 PROCEDURE — 83735 ASSAY OF MAGNESIUM: CPT

## 2023-02-07 PROCEDURE — 77030004550 HC CATH ANGI DX PRF MRTM -B: Performed by: INTERNAL MEDICINE

## 2023-02-07 PROCEDURE — 93306 TTE W/DOPPLER COMPLETE: CPT

## 2023-02-07 PROCEDURE — 85025 COMPLETE CBC W/AUTO DIFF WBC: CPT

## 2023-02-07 PROCEDURE — 74011250636 HC RX REV CODE- 250/636: Performed by: INTERNAL MEDICINE

## 2023-02-07 PROCEDURE — 84484 ASSAY OF TROPONIN QUANT: CPT

## 2023-02-07 PROCEDURE — B2151ZZ FLUOROSCOPY OF LEFT HEART USING LOW OSMOLAR CONTRAST: ICD-10-PCS | Performed by: INTERNAL MEDICINE

## 2023-02-07 PROCEDURE — 02703ZZ DILATION OF CORONARY ARTERY, ONE ARTERY, PERCUTANEOUS APPROACH: ICD-10-PCS | Performed by: INTERNAL MEDICINE

## 2023-02-07 PROCEDURE — 80048 BASIC METABOLIC PNL TOTAL CA: CPT

## 2023-02-07 PROCEDURE — 65270000029 HC RM PRIVATE

## 2023-02-07 PROCEDURE — 99153 MOD SED SAME PHYS/QHP EA: CPT | Performed by: INTERNAL MEDICINE

## 2023-02-07 PROCEDURE — C1725 CATH, TRANSLUMIN NON-LASER: HCPCS | Performed by: INTERNAL MEDICINE

## 2023-02-07 PROCEDURE — 77030025703 HC SYR ANGI VACLOK MRTM -A: Performed by: INTERNAL MEDICINE

## 2023-02-07 RX ORDER — DOPAMINE HYDROCHLORIDE 320 MG/100ML
INJECTION, SOLUTION INTRAVENOUS
Status: COMPLETED | OUTPATIENT
Start: 2023-02-07 | End: 2023-02-07

## 2023-02-07 RX ORDER — DIPHENHYDRAMINE HYDROCHLORIDE 50 MG/ML
INJECTION, SOLUTION INTRAMUSCULAR; INTRAVENOUS AS NEEDED
Status: DISCONTINUED | OUTPATIENT
Start: 2023-02-07 | End: 2023-02-07 | Stop reason: HOSPADM

## 2023-02-07 RX ORDER — SODIUM CHLORIDE 0.9 % (FLUSH) 0.9 %
5-40 SYRINGE (ML) INJECTION EVERY 8 HOURS
Status: DISCONTINUED | OUTPATIENT
Start: 2023-02-07 | End: 2023-02-08 | Stop reason: HOSPADM

## 2023-02-07 RX ORDER — SODIUM CHLORIDE 0.9 % (FLUSH) 0.9 %
5-40 SYRINGE (ML) INJECTION AS NEEDED
Status: DISCONTINUED | OUTPATIENT
Start: 2023-02-07 | End: 2023-02-08 | Stop reason: HOSPADM

## 2023-02-07 RX ORDER — MIDAZOLAM HYDROCHLORIDE 1 MG/ML
INJECTION INTRAMUSCULAR; INTRAVENOUS AS NEEDED
Status: DISCONTINUED | OUTPATIENT
Start: 2023-02-07 | End: 2023-02-07 | Stop reason: HOSPADM

## 2023-02-07 RX ORDER — HEPARIN SODIUM 1000 [USP'U]/ML
INJECTION, SOLUTION INTRAVENOUS; SUBCUTANEOUS AS NEEDED
Status: DISCONTINUED | OUTPATIENT
Start: 2023-02-07 | End: 2023-02-07 | Stop reason: HOSPADM

## 2023-02-07 RX ORDER — ATENOLOL 25 MG/1
25 TABLET ORAL DAILY
Status: DISCONTINUED | OUTPATIENT
Start: 2023-02-08 | End: 2023-02-08 | Stop reason: HOSPADM

## 2023-02-07 RX ORDER — FENTANYL CITRATE 50 UG/ML
INJECTION, SOLUTION INTRAMUSCULAR; INTRAVENOUS AS NEEDED
Status: DISCONTINUED | OUTPATIENT
Start: 2023-02-07 | End: 2023-02-07 | Stop reason: HOSPADM

## 2023-02-07 RX ORDER — LIDOCAINE HYDROCHLORIDE 10 MG/ML
INJECTION INFILTRATION; PERINEURAL AS NEEDED
Status: DISCONTINUED | OUTPATIENT
Start: 2023-02-07 | End: 2023-02-07 | Stop reason: HOSPADM

## 2023-02-07 RX ADMIN — HYDROCHLOROTHIAZIDE 12.5 MG: 25 TABLET ORAL at 09:43

## 2023-02-07 RX ADMIN — ATORVASTATIN CALCIUM 20 MG: 20 TABLET, FILM COATED ORAL at 22:14

## 2023-02-07 RX ADMIN — SODIUM CHLORIDE, PRESERVATIVE FREE 10 ML: 5 INJECTION INTRAVENOUS at 05:59

## 2023-02-07 RX ADMIN — HEPARIN SODIUM 12 UNITS/KG/HR: 10000 INJECTION, SOLUTION INTRAVENOUS at 13:56

## 2023-02-07 RX ADMIN — TICAGRELOR 90 MG: 90 TABLET ORAL at 09:43

## 2023-02-07 RX ADMIN — TICAGRELOR 90 MG: 90 TABLET ORAL at 19:42

## 2023-02-07 RX ADMIN — LOSARTAN POTASSIUM 100 MG: 50 TABLET, FILM COATED ORAL at 09:43

## 2023-02-07 RX ADMIN — ACETAMINOPHEN 650 MG: 325 TABLET ORAL at 19:41

## 2023-02-07 RX ADMIN — SODIUM CHLORIDE, PRESERVATIVE FREE 10 ML: 5 INJECTION INTRAVENOUS at 22:18

## 2023-02-07 RX ADMIN — ASPIRIN 81 MG: 81 TABLET, COATED ORAL at 09:43

## 2023-02-07 RX ADMIN — HEPARIN SODIUM 2000 UNITS: 1000 INJECTION, SOLUTION INTRAVENOUS; SUBCUTANEOUS at 22:15

## 2023-02-07 RX ADMIN — ATENOLOL 50 MG: 25 TABLET ORAL at 09:43

## 2023-02-07 RX ADMIN — ACETAMINOPHEN 650 MG: 325 TABLET ORAL at 06:20

## 2023-02-07 RX ADMIN — MELATONIN TAB 3 MG 3 MG: 3 TAB at 22:15

## 2023-02-07 NOTE — PROGRESS NOTES
Hospitalist Progress Note    NAME: Anna Marie Lopez   :  1970   MRN:  [id number]     Subjective:   Daily Progress Note: 2023 2:35 PM      Chief complaint: Admitted for anginal equivalent  Patient seen and examined, chart was reviewed. Patient undergoing cardiac cath today.   No other acute issues reported to me by staff at this time       Current Facility-Administered Medications   Medication Dose Route Frequency    sodium chloride (NS) flush 5-40 mL  5-40 mL IntraVENous Q8H    sodium chloride (NS) flush 5-40 mL  5-40 mL IntraVENous PRN    heparin 25,000 units in D5W 250 ml infusion  12-25 Units/kg/hr IntraVENous TITRATE    aspirin delayed-release tablet 81 mg  81 mg Oral DAILY    buPROPion SR (WELLBUTRIN SR) tablet 150 mg  150 mg Oral DAILY    ezetimibe (ZETIA) tablet 10 mg  10 mg Oral DAILY    hydroCHLOROthiazide (HYDRODIURIL) tablet 12.5 mg  12.5 mg Oral DAILY    hydrOXYzine HCL (ATARAX) tablet 25 mg  25 mg Oral Q6H PRN    losartan (COZAAR) tablet 100 mg  100 mg Oral DAILY    atenoloL (TENORMIN) tablet 50 mg  50 mg Oral DAILY    atorvastatin (LIPITOR) tablet 20 mg  20 mg Oral QHS    sodium chloride (NS) flush 5-40 mL  5-40 mL IntraVENous Q8H    sodium chloride (NS) flush 5-40 mL  5-40 mL IntraVENous PRN    acetaminophen (TYLENOL) tablet 650 mg  650 mg Oral Q6H PRN    Or    acetaminophen (TYLENOL) suppository 650 mg  650 mg Rectal Q6H PRN    polyethylene glycol (MIRALAX) packet 17 g  17 g Oral DAILY PRN    ondansetron (ZOFRAN ODT) tablet 4 mg  4 mg Oral Q8H PRN    Or    ondansetron (ZOFRAN) injection 4 mg  4 mg IntraVENous Q6H PRN    heparin (porcine) 1,000 unit/mL injection 4,000 Units  4,000 Units IntraVENous PRN    Or    heparin (porcine) 1,000 unit/mL injection 2,000 Units  2,000 Units IntraVENous PRN    ticagrelor (BRILINTA) tablet 90 mg  90 mg Oral Q12H    melatonin tablet 3 mg  3 mg Oral QHS          Objective:     Visit Vitals  BP (!) 144/81   Pulse (!) 57   Temp 98.7 °F (37.1 °C)   Resp 22   Ht 5' 11\" (1.803 m)   Wt 112.5 kg (248 lb)   SpO2 96%   BMI 34.59 kg/m²      O2 Device: None (Room air)    No data recorded. PHYSICAL EXAM:  General-WDWN  Neck-supple  CVS-bradycardic  Respiratory-symmetric expansion  Abdomen-soft, ND  Extremities-moves all  Neuro-no focal deficits  Skin-no visible rash  Psych-normal affect          Data Review    Recent Results (from the past 24 hour(s))   PTT    Collection Time: 02/06/23  3:11 PM   Result Value Ref Range    aPTT 26.1 21.2 - 34.1 sec    aPTT, therapeutic range   82 - 109 sec   ANTI-XA UNFRACTIONATED AND LMW HEPARIN    Collection Time: 02/06/23  3:11 PM   Result Value Ref Range    Heparin Xa,Unfrac. and LMW <0.10 IU/mL   CBC WITH AUTOMATED DIFF    Collection Time: 02/06/23  3:11 PM   Result Value Ref Range    WBC 6.3 4.1 - 11.1 K/uL    RBC 4.84 4.10 - 5.70 M/uL    HGB 14.7 12.1 - 17.0 g/dL    HCT 42.0 36.6 - 50.3 %    MCV 86.8 80.0 - 99.0 FL    MCH 30.4 26.0 - 34.0 PG    MCHC 35.0 30.0 - 36.5 g/dL    RDW 12.5 11.5 - 14.5 %    PLATELET 748 (L) 367 - 400 K/uL    MPV 11.9 8.9 - 12.9 FL    NRBC 0.0 0.0  WBC    ABSOLUTE NRBC 0.00 0.00 - 0.01 K/uL    NEUTROPHILS 69 32 - 75 %    LYMPHOCYTES 21 12 - 49 %    MONOCYTES 8 5 - 13 %    EOSINOPHILS 1 0 - 7 %    BASOPHILS 1 0 - 1 %    IMMATURE GRANULOCYTES 0 0 - 0.5 %    ABS. NEUTROPHILS 4.3 1.8 - 8.0 K/UL    ABS. LYMPHOCYTES 1.3 0.8 - 3.5 K/UL    ABS. MONOCYTES 0.5 0.0 - 1.0 K/UL    ABS. EOSINOPHILS 0.1 0.0 - 0.4 K/UL    ABS. BASOPHILS 0.0 0.0 - 0.1 K/UL    ABS. IMM.  GRANS. 0.0 0.00 - 0.04 K/UL    DF AUTOMATED     TROPONIN-HIGH SENSITIVITY    Collection Time: 02/06/23  3:11 PM   Result Value Ref Range    Troponin-High Sensitivity 257 (HH) 0 - 76 ng/L   ANTI-XA UNFRACTIONATED AND LMW HEPARIN    Collection Time: 02/06/23 10:19 PM   Result Value Ref Range    Heparin Xa,Unfrac. and LMW <0.10 IU/mL   ANTI-XA UNFRACTIONATED AND LMW HEPARIN    Collection Time: 02/07/23  4:06 AM   Result Value Ref Range    Heparin Xa,Unfrac. and LMW 8.73 IU/mL   METABOLIC PANEL, BASIC    Collection Time: 02/07/23  4:06 AM   Result Value Ref Range    Sodium 140 136 - 145 mmol/L    Potassium 3.4 (L) 3.5 - 5.1 mmol/L    Chloride 110 (H) 97 - 108 mmol/L    CO2 25 21 - 32 mmol/L    Anion gap 5 5 - 15 mmol/L    Glucose 99 65 - 100 mg/dL    BUN 12 6 - 20 mg/dL    Creatinine 0.91 0.70 - 1.30 mg/dL    BUN/Creatinine ratio 13 12 - 20      eGFR >60 >60 ml/min/1.73m2    Calcium 8.8 8.5 - 10.1 mg/dL   MAGNESIUM    Collection Time: 02/07/23  4:06 AM   Result Value Ref Range    Magnesium 2.2 1.6 - 2.4 mg/dL   CBC WITH AUTOMATED DIFF    Collection Time: 02/07/23  4:06 AM   Result Value Ref Range    WBC 5.7 4.1 - 11.1 K/uL    RBC 4.66 4.10 - 5.70 M/uL    HGB 14.2 12.1 - 17.0 g/dL    HCT 41.0 36.6 - 50.3 %    MCV 88.0 80.0 - 99.0 FL    MCH 30.5 26.0 - 34.0 PG    MCHC 34.6 30.0 - 36.5 g/dL    RDW 12.9 11.5 - 14.5 %    PLATELET 067 (L) 478 - 400 K/uL    MPV 12.2 8.9 - 12.9 FL    NRBC 0.0 0.0  WBC    ABSOLUTE NRBC 0.00 0.00 - 0.01 K/uL    NEUTROPHILS 56 32 - 75 %    LYMPHOCYTES 31 12 - 49 %    MONOCYTES 9 5 - 13 %    EOSINOPHILS 3 0 - 7 %    BASOPHILS 1 0 - 1 %    IMMATURE GRANULOCYTES 0 0 - 0.5 %    ABS. NEUTROPHILS 3.2 1.8 - 8.0 K/UL    ABS. LYMPHOCYTES 1.8 0.8 - 3.5 K/UL    ABS. MONOCYTES 0.5 0.0 - 1.0 K/UL    ABS. EOSINOPHILS 0.2 0.0 - 0.4 K/UL    ABS. BASOPHILS 0.1 0.0 - 0.1 K/UL    ABS. IMM.  GRANS. 0.0 0.00 - 0.04 K/UL    DF AUTOMATED     TROPONIN-HIGH SENSITIVITY    Collection Time: 02/07/23  4:06 AM   Result Value Ref Range    Troponin-High Sensitivity 236 (HH) 0 - 76 ng/L   LIPID PANEL    Collection Time: 02/07/23  4:06 AM   Result Value Ref Range    LIPID PROFILE        Cholesterol, total 108 <200 mg/dL    Triglyceride 79 <150 mg/dL    HDL Cholesterol 45 mg/dL    LDL, calculated 47.2 0 - 100 mg/dL    VLDL, calculated 15.8 mg/dL    CHOL/HDL Ratio 2.4 0.0 - 5.0     ANTI-XA UNFRACTIONATED AND LMW HEPARIN    Collection Time: 02/07/23 10:31 AM Result Value Ref Range    Heparin Xa,Unfrac. and LMW 0.31 IU/mL   TROPONIN-HIGH SENSITIVITY    Collection Time: 02/07/23 10:31 AM   Result Value Ref Range    Troponin-High Sensitivity 224 (HH) 0 - 76 ng/L   POC ACTIVATED CLOTTING TIME    Collection Time: 02/07/23 11:32 AM   Result Value Ref Range    Activated Clotting Time (POC) 101 74 - 125 sec    Performed by Jeremi Farias    POC ACTIVATED CLOTTING TIME    Collection Time: 02/07/23 11:45 AM   Result Value Ref Range    Activated Clotting Time (POC) 233 (H) 74 - 125 sec    Performed by Jeremi Farias      No results found for this visit on 02/06/23. All Micro Results       None          CT head-no acute abnormality  Chest x-ray-clear       Assessment/Plan with MDM and DDx:     Suspected NST elevation MI-  Troponin in 200 range  Follow cardiac cath report today  Heparin drip 48 hours  Continue meds per cardiology  Continue aspirin/atenolol/statins/Brilinta  Follow echocardiogram    HTN-monitor BP  Bradycardia-monitor on atenolol dose decreased.  Holding parameters  Hypokalemia-repleted    Advance directive-full code  DVT prophylaxis-heparin drip  Next of kin-wife  Dispo-pending cardiology clearance and work-up to complete    Signed By: Marquita Jiménez MD     February 7, 2023

## 2023-02-07 NOTE — PROGRESS NOTES
Progress Note      2/7/2023 11:05 AM  NAME: Adrian Bella   MRN:  [id number]   Admit Diagnosis: NSTEMI (non-ST elevated myocardial infarction) ([facility] [I21.4]      Subjective:   Patient stated that he did not had a very good night and he still has a symptoms of some tingling sensation. His troponin I is 236. Denies any chest tightness however. Review of Systems:    Symptom Y/N Comments  Symptom Y/N Comments   Fever/Chills n   Chest Pain n    Poor Appetite    Edema     Cough    Abdominal Pain n    Sputum    Joint Pain     SOB/GRANADOS n   Pruritis/Rash     Nausea/vomit    Other     Diarrhea         Constipation           Could NOT obtain due to:      Objective:        Physical Exam:    Last 24hrs VS reviewed since prior progress note. Most recent are:    Visit Vitals  /70   Pulse 60   Temp 98.7 °F (37.1 °C)   Resp 17   Ht 5' 11\" (1.803 m)   Wt 112.5 kg (248 lb)   SpO2 93%   BMI 34.59 kg/m²     No intake or output data in the 24 hours ending 02/07/23 1105     General Appearance: Well developed, well nourished, alert & oriented x 3,    no acute distress. Ears/Nose/Mouth/Throat: Hearing grossly normal.  Neck: Supple. Chest: Lungs clear to auscultation bilaterally. Cardiovascular: Regular rate and rhythm, S1,S2 normal, no murmur. Abdomen: Soft, non-tender, bowel sounds are active. Extremities: No edema bilaterally. Skin: Warm and dry. []         Post-cath site without hematoma, bruit, tenderness, or thrill. Distal pulses intact.     PMH/SH reviewed - no change compared to H&P    Data Review    Telemetry: normal sinus rhythm     EKG:   []  No new EKG for review    Lab Data Personally Reviewed:    Recent Labs     02/07/23  0406 02/06/23  1511   WBC 5.7 6.3   HGB 14.2 14.7   HCT 41.0 42.0   * 139*     Recent Labs     02/06/23  1511   APTT 26.1      Recent Labs     02/07/23  0406 02/06/23  1308    141   K 3.4* 3.5   * 107   CO2 25 27   BUN 12 11   CREA 0.91 1.01   GLU 99 101*   CA 8.8 9.0   MG 2.2  --      No results for input(s): CPK, CKNDX, TROIQ in the last 72 hours. No lab exists for component: CPKMB  Lab Results   Component Value Date/Time    Cholesterol, total 108 02/07/2023 04:06 AM    HDL Cholesterol 45 02/07/2023 04:06 AM    LDL, calculated 47.2 02/07/2023 04:06 AM    Triglyceride 79 02/07/2023 04:06 AM    CHOL/HDL Ratio 2.4 02/07/2023 04:06 AM       Recent Labs     02/06/23  1308   AP 65   TP 6.7   ALB 3.9   GLOB 2.8     No results for input(s): PH, PCO2, PO2 in the last 72 hours.     Medications Personally Reviewed:    Current Facility-Administered Medications   Medication Dose Route Frequency    heparin 25,000 units in D5W 250 ml infusion  12-25 Units/kg/hr IntraVENous TITRATE    aspirin delayed-release tablet 81 mg  81 mg Oral DAILY    buPROPion SR (WELLBUTRIN SR) tablet 150 mg  150 mg Oral DAILY    ezetimibe (ZETIA) tablet 10 mg  10 mg Oral DAILY    hydroCHLOROthiazide (HYDRODIURIL) tablet 12.5 mg  12.5 mg Oral DAILY    hydrOXYzine HCL (ATARAX) tablet 25 mg  25 mg Oral Q6H PRN    losartan (COZAAR) tablet 100 mg  100 mg Oral DAILY    atenoloL (TENORMIN) tablet 50 mg  50 mg Oral DAILY    atorvastatin (LIPITOR) tablet 20 mg  20 mg Oral QHS    sodium chloride (NS) flush 5-40 mL  5-40 mL IntraVENous Q8H    sodium chloride (NS) flush 5-40 mL  5-40 mL IntraVENous PRN    acetaminophen (TYLENOL) tablet 650 mg  650 mg Oral Q6H PRN    Or    acetaminophen (TYLENOL) suppository 650 mg  650 mg Rectal Q6H PRN    polyethylene glycol (MIRALAX) packet 17 g  17 g Oral DAILY PRN    ondansetron (ZOFRAN ODT) tablet 4 mg  4 mg Oral Q8H PRN    Or    ondansetron (ZOFRAN) injection 4 mg  4 mg IntraVENous Q6H PRN    heparin (porcine) 1,000 unit/mL injection 4,000 Units  4,000 Units IntraVENous PRN    Or    heparin (porcine) 1,000 unit/mL injection 2,000 Units  2,000 Units IntraVENous PRN    ticagrelor (BRILINTA) tablet 90 mg  90 mg Oral Q12H    melatonin tablet 3 mg  3 mg Oral QHS           Problem List:   Patient has a history of coronary artery disease and a stent insertion in 2017. His symptoms of numbness and tingling of left arm and headache are similar when he had his a previous symptoms of angina and was found to have coronary disease and stent insertion and should be considered angina equivalent now. Hyperlipidemia. Statin intolerance. Hypertension. His potassium level is somewhat low. Assessment/Plan:   I discussed with the patient and with his wife and will probably consider cardiac catheterization and possible angioplasty stent insertion as his troponin I is high concerning for non-Q wave myocardial infarction though his chest pain is not very typical for that of acute coronary syndrome. I discussed the procedure and risk benefits including risk of vascular injury, hematoma, need for packed cell transfusion, cardiac arrhythmias, contrast related nephropathy, myocardial infarction etc. and answered all the questions in this regard. Patient understands and agrees. We will proceed. Thank you. [x]       High complexity decision making was performed in this patient at high risk for decompensation with multiple organ involvement.     Genevieve Adam MD

## 2023-02-07 NOTE — CONSULTS
Consult    NAME: Heidy Navarrete   :  1970   MRN:  [id number]     Date/Time:  2023 7:27 PM    Patient PCP: Hannah Ward NP  ________________________________________________________________________      Subjective:   CHIEF COMPLAINT: Patient stated that while he was working he experienced some headache and numbness of the left side and that he had a similar presentation when he had a previous episode of unstable angina and received stents. HISTORY OF PRESENT ILLNESS:   Patient stated that in the 2017 and he had a similar episode of headache and tingling and numbness in the arm and he received a stent to distal right coronary and distal posterior descending artery and balloon angioplasty of posterolateral branch of right coronary artery. He experienced some headache and the tingling and numbness that did not had any typical chest tightness or chest discomfort or shortness of breath. He has a strong family history of coronary artery disease in multiple family members and that he has a documented coronary disease with intervention and that he got very concerned and presented to emergency room. His troponin I is somewhat elevated concerning for non-Q wave myocardial infarction and cardiology consultation is invited. No past medical history on file. Significant for hyperlipidemia. History of angina and patient received a stent to distal PDA and balloon angioplasty to posterolateral branch of right coronary and intervention of distal right coronary artery in 2017. No past surgical history on file. History of appendectomy and cholecystectomy. No Known Allergies   Meds:  See below  Social History     Tobacco Use    Smoking status: Never    Smokeless tobacco: Never   Substance Use Topics    Alcohol use: Yes     Comment: occ   Negative for smoking, takes a social drink and no history of drug abuse. No family history on file.      FAMILY HISTORY: Mother is 76years old and is alcoholic and patient does not know much about her. Brother  at the age of 39 from heart attack and father  at the age of 61 he had a heart problem and he committed suicide. Many close family members have a heart problem at young age. PERSONAL HISTORY : Patient is  and has 5 children and works as a . REVIEW OF SYSTEMS:     []         Unable to obtain  ROS due to ---   [x]         Total of 12 systems reviewed as follows:    Constitutional: negative fever, negative chills, negative weight loss  Eyes:   negative visual changes  ENT:   negative sore throat, tongue or lip swelling  Respiratory:  negative cough, negative dyspnea  Cards:  negative for chest pain, palpitations, lower extremity edema  GI:   negative for nausea, vomiting, diarrhea, and abdominal pain  Genitourinary: negative for frequency, dysuria  Integument:  negative for rash   Hematologic:  negative for easy bruising and gum/nose bleeding  Musculoskel: negative for myalgias,  back pain  Neurological: Positive for headaches, left upper extremity  Behavl/Psych: negative for feelings of anxiety, depression     Pertinent Positives include :    Objective:      Physical Exam:    Last 24hrs VS reviewed since prior progress note. Most recent are:    Visit Vitals  /86   Pulse (!) 56   Temp 98.7 °F (37.1 °C)   Resp 16   Ht 5' 11\" (1.803 m)   Wt 112.5 kg (248 lb)   SpO2 94%   BMI 34.59 kg/m²     No intake or output data in the 24 hours ending 23     General Appearance: Well developed, well nourished, alert & oriented x 3,    no acute distress. Ears/Nose/Mouth/Throat: Pupils equal and round, Hearing grossly normal.  Neck: Supple. JVP within normal limits. Carotids good upstrokes, with no bruit. Chest: Lungs clear to auscultation bilaterally. Cardiovascular: Regular rate and rhythm, S1S2 normal, no murmur, rubs, gallops. Abdomen: Soft, non-tender, bowel sounds are active. No organomegaly.   Extremities: No edema bilaterally. Femoral pulses +2, Distal Pulses +1. Skin: Warm and dry. Neuro: CN II-XII grossly intact, Strength and sensation grossly intact. []         Post-cath site without hematoma, bruit, tenderness, or thrill. Distal pulses intact. Data:      Telemetry: Normal sinus rhythm    EKG:  []  No new EKG for review. Prior to Admission medications    Medication Sig Start Date End Date Taking? Authorizing Provider   hydroCHLOROthiazide (HYDRODIURIL) 12.5 mg tablet TAKE ONE TABLET BY MOUTH ONE TIME DAILY FOR HIGH BLOOD PRESSURE 1/30/23   Arivaca Groveland D NP   nebivoloL (BYSTOLIC) 10 mg tablet TAKE ONE TABLET BY MOUTH EVERY MORNING FOR HIGH BLOOD PRESSURE 1/30/23   Arivaca Groveland D NP   buPROPion XL (WELLBUTRIN XL) 150 mg tablet TAKE ONE TABLET BY MOUTH EVERY MORNING 1/30/23   Arivaca Groveland D NP   LORazepam (ATIVAN) 0.5 mg tablet Take 1 Tablet by mouth nightly as needed for Anxiety (SLEEP). Max Daily Amount: 0.5 mg. 1/6/23   Arivaca Groveland MARY NP   Dupixent Pen 300 mg/2 mL pnij  11/20/22   Provider, Historical   fluticasone propionate (FLONASE) 50 mcg/actuation nasal spray 2 Sprays by Both Nostrils route daily. 12/6/22   Arivaca Groveland MARY NP   aspirin delayed-release 81 mg tablet Take 81 mg by mouth in the morning. Provider, Historical   hydrOXYzine HCL (ATARAX) 25 mg tablet  7/31/22   Provider, Historical   ezetimibe (ZETIA) 10 mg tablet Take 10 mg by mouth. Provider, Historical   losartan (COZAAR) 100 mg tablet Take 100 mg by mouth daily. Provider, Historical   pitavastatin calcium (LIVALO) 4 mg tab tablet Take 4 mg by mouth daily.     Provider, Historical       Recent Results (from the past 24 hour(s))   EKG, 12 LEAD, INITIAL    Collection Time: 02/06/23 12:59 PM   Result Value Ref Range    Ventricular Rate 56 BPM    Atrial Rate 56 BPM    P-R Interval 180 ms    QRS Duration 98 ms    Q-T Interval 438 ms    QTC Calculation (Bezet) 422 ms    Calculated P Axis 26 degrees    Calculated R Axis -16 degrees    Calculated T Axis -2 degrees    Diagnosis       Sinus bradycardia  Moderate voltage criteria for LVH, may be normal variant  Borderline ECG  No previous ECGs available  Confirmed by Leandro STRAUSS MD ([id number]) on 2/6/2023 5:48:00 PM     CBC WITH AUTOMATED DIFF    Collection Time: 02/06/23  1:08 PM   Result Value Ref Range    WBC 5.9 4.1 - 11.1 K/uL    RBC 4.82 4.10 - 5.70 M/uL    HGB 14.8 12.1 - 17.0 g/dL    HCT 41.9 36.6 - 50.3 %    MCV 86.9 80.0 - 99.0 FL    MCH 30.7 26.0 - 34.0 PG    MCHC 35.3 30.0 - 36.5 g/dL    RDW 12.6 11.5 - 14.5 %    PLATELET 670 (L) 743 - 400 K/uL    MPV 11.8 8.9 - 12.9 FL    NRBC 0.0 0.0  WBC    ABSOLUTE NRBC 0.00 0.00 - 0.01 K/uL    NEUTROPHILS 64 32 - 75 %    LYMPHOCYTES 23 12 - 49 %    MONOCYTES 10 5 - 13 %    EOSINOPHILS 2 0 - 7 %    BASOPHILS 1 0 - 1 %    IMMATURE GRANULOCYTES 0 0 - 0.5 %    ABS. NEUTROPHILS 3.8 1.8 - 8.0 K/UL    ABS. LYMPHOCYTES 1.4 0.8 - 3.5 K/UL    ABS. MONOCYTES 0.6 0.0 - 1.0 K/UL    ABS. EOSINOPHILS 0.1 0.0 - 0.4 K/UL    ABS. BASOPHILS 0.1 0.0 - 0.1 K/UL    ABS. IMM. GRANS. 0.0 0.00 - 0.04 K/UL    DF AUTOMATED     METABOLIC PANEL, COMPREHENSIVE    Collection Time: 02/06/23  1:08 PM   Result Value Ref Range    Sodium 141 136 - 145 mmol/L    Potassium 3.5 3.5 - 5.1 mmol/L    Chloride 107 97 - 108 mmol/L    CO2 27 21 - 32 mmol/L    Anion gap 7 5 - 15 mmol/L    Glucose 101 (H) 65 - 100 mg/dL    BUN 11 6 - 20 mg/dL    Creatinine 1.01 0.70 - 1.30 mg/dL    BUN/Creatinine ratio 11 (L) 12 - 20      eGFR >60 >60 ml/min/1.73m2    Calcium 9.0 8.5 - 10.1 mg/dL    Bilirubin, total 0.8 0.2 - 1.0 mg/dL    AST (SGOT) 21 15 - 37 U/L    ALT (SGPT) 32 12 - 78 U/L    Alk.  phosphatase 65 45 - 117 U/L    Protein, total 6.7 6.4 - 8.2 g/dL    Albumin 3.9 3.5 - 5.0 g/dL    Globulin 2.8 2.0 - 4.0 g/dL    A-G Ratio 1.4 1.1 - 2.2     TROPONIN-HIGH SENSITIVITY    Collection Time: 02/06/23  1:08 PM   Result Value Ref Range    Troponin-High Sensitivity 263 (HH) 0 - 76 ng/L   PTT Collection Time: 02/06/23  3:11 PM   Result Value Ref Range    aPTT 26.1 21.2 - 34.1 sec    aPTT, therapeutic range   82 - 109 sec   ANTI-XA UNFRACTIONATED AND LMW HEPARIN    Collection Time: 02/06/23  3:11 PM   Result Value Ref Range    Heparin Xa,Unfrac. and LMW <0.10 IU/mL   CBC WITH AUTOMATED DIFF    Collection Time: 02/06/23  3:11 PM   Result Value Ref Range    WBC 6.3 4.1 - 11.1 K/uL    RBC 4.84 4.10 - 5.70 M/uL    HGB 14.7 12.1 - 17.0 g/dL    HCT 42.0 36.6 - 50.3 %    MCV 86.8 80.0 - 99.0 FL    MCH 30.4 26.0 - 34.0 PG    MCHC 35.0 30.0 - 36.5 g/dL    RDW 12.5 11.5 - 14.5 %    PLATELET 744 (L) 731 - 400 K/uL    MPV 11.9 8.9 - 12.9 FL    NRBC 0.0 0.0  WBC    ABSOLUTE NRBC 0.00 0.00 - 0.01 K/uL    NEUTROPHILS 69 32 - 75 %    LYMPHOCYTES 21 12 - 49 %    MONOCYTES 8 5 - 13 %    EOSINOPHILS 1 0 - 7 %    BASOPHILS 1 0 - 1 %    IMMATURE GRANULOCYTES 0 0 - 0.5 %    ABS. NEUTROPHILS 4.3 1.8 - 8.0 K/UL    ABS. LYMPHOCYTES 1.3 0.8 - 3.5 K/UL    ABS. MONOCYTES 0.5 0.0 - 1.0 K/UL    ABS. EOSINOPHILS 0.1 0.0 - 0.4 K/UL    ABS. BASOPHILS 0.0 0.0 - 0.1 K/UL    ABS. IMM. GRANS. 0.0 0.00 - 0.04 K/UL    DF AUTOMATED     TROPONIN-HIGH SENSITIVITY    Collection Time: 02/06/23  3:11 PM   Result Value Ref Range    Troponin-High Sensitivity 257 (HH) 0 - 76 ng/L         Assessment:   Patient's troponin I is elevated and even though his presentation of her headache and numbness of her arm is not classical for unstable angina, patient had exactly similar presentation when he had his angina and underwent coronary intervention in 2017 and warrants attention. Hyperlipidemia and patient is statin intolerant. Family history of premature coronary artery disease. Plan:   I will consider dual antiplatelet medications. We will check serial EKG and enzymes and echocardiogram.  May consider cardiac catheterization and possible angioplasty stent insertion if troponin I is rising.   Or can also consider stress test.  I had extensive discussion with the patient in the presence of his wife and explained them in detail plan of care and work-up. Since patient has a statin intolerance probably patient should be given Nexlizet that showed definitely helped to get LDL at the target. Thank you.         [x]        High complexity decision making was performed    Velvet Burgess MD

## 2023-02-08 NOTE — PROGRESS NOTES
Patient transferred to [facility] for PCI of  (Dr. Marquis Pearce). Patient's information was forwarded to [facility] for follow up.

## 2023-02-08 NOTE — PROGRESS NOTES
Problem: General Medical Care Plan  Goal: *Absence of infection signs and symptoms  Outcome: Progressing Towards Goal     Problem: Falls - Risk of  Goal: *Absence of Falls  Description: Document [place] Fall Risk and appropriate interventions in the flowsheet.   Outcome: Progressing Towards Goal  Note: Fall Risk Interventions:                                Problem: Patient Education: Go to Patient Education Activity  Goal: Patient/Family Education  Outcome: Progressing Towards Goal

## 2023-02-08 NOTE — DISCHARGE SUMMARY
Hospitalist Discharge Summary     Patient ID:  Prince Homans  [id number]  45 y.o.  1970 2/6/2023    PCP on record: Sudeep Quinteros NP    Admit date: 2/6/2023  Discharge date and time: 2/7/2023    DISCHARGE DIAGNOSIS:  Suspected NSTEMI  HTN  Bradycardia  Hypokalemia    CONSULTATIONS:  IP CONSULT TO CARDIOLOGY    Excerpted HPI from H&P of Alice Singh MD:  Subjective: 20-year-old male with past medical history of multiple comorbidities presents to [facility] with complaints of headache as well as left-sided numbness/tingling, the symptoms have since resolved however the symptoms were similar to his prior events of a non-ST elevation MI. Patient denies any fever chills nausea vomiting lightheadedness dizziness dyspnea diaphoresis nocturnal dyspnea chest pain palpitations headache focal weakness auditory or visual symptoms abdominal stool or urinary complaints or any other associated symptoms. Patient endorses no recent sick contacts or travel activity    ______________________________________________________________________  DISCHARGE SUMMARY/HOSPITAL COURSE:  for full details see H&P, daily progress notes, labs, consult notes. ECHO- Echo Findings    Left Ventricle Normal left ventricular systolic function with a visually estimated EF of 60 - 65%. Left ventricle size is normal. Increased wall thickness. Normal wall motion. Normal diastolic function. Left Atrium Left atrium size is normal.   Interatrial Septum No interatrial shunt visualized with color Doppler. Right Ventricle Right ventricle size is normal. Normal wall thickness. Normal systolic function. Right Atrium Right atrium size is normal.   Aortic Valve Valve structure is normal. No regurgitation. No stenosis. Mitral Valve Valve structure is normal. Trace regurgitation. No stenosis noted. Tricuspid Valve Valve structure is normal. Trace regurgitation. No stenosis noted.    Pulmonic [address] Comprehensive Valve The pulmonic valve visualization is suboptimal but appears to be functioning normally. No regurgitation. No stenosis noted. Aorta Normal sized aortic root and ascending aorta. IVC/Hepatic Veins IVC was not assessed due to poor image quality. Pericardium No pericardial effusion. CARDIAC CATH 2/8/23- Conclusion    Indication : Non-Q wave myocardial infarction. History of a previous myocardial infarction and stent insertion. Left main coronary artery is a large-caliber vessel and has no significant stenosis. LAD artery is a large-caliber vessel and proximal segment has no stenosis mid segment appears to have about 30% stenosis. Distal LAD and the diagonal branches has no significant stenosis. Complex artery is a large-caliber vessel and proximal segment has no stenosis. AV groove segment has about 75% stenosis this appears to be significant. After this AV groove segment continues and a large obtuse marginal branch comes out. Right coronary artery is a dominant vessel and large caliber and proximal segment has a irregularity but no more than 25% stenosis. Mid segment also has a luminal irregularity and distal segment just at the end of her distal artery at the bifurcation of PDA and PL branch has a stent and in the terminal portion of the stent there appears to be significant in-stent restenosis and that this blockage continues to the origin of the PDA. PL branch seems to be occluded. The blockage was presumed to be 99%. This was deemed to be culprit lesion and attempt was made to intervene. Patient is on Brilinta and heparin and ACT was therapeutic. Run-through wire was attempted to negotiate but did not cross. Silon wire was also brought and balloon backup was used with a 2 mm x 12 mm and a with that also it was unsuccessful to cross the lesion and this may be chronic total occlusion at the blood flow is a probably from a bridge collaterals.   Decision was made to abort the procedure and refer him to Dr. Ravi Chávez for a  intervention. Probably patient will need intervention of the circumflex artery also. Left ventriculography reveals ejection fraction is about 55%. Plan : Continue dual antiplatelet medications and I will transfer patient to [facility] to Dr. Ravi Chávez and his practice is familiar with the patient anyway. Thank you. Came to see patient today 2/8/2023, per chart patient was transferred to [facility] for higher level of care by cardiology Dr. Jasmina Rowley last night. Patient was admitted for MI, he went to cardiac catheterization 2/7/23 which results as mentioned above. Patient transferred to [facility] for PCI of  (Dr. Ravi Chávez) per chart last night. Please see chart for further details regarding his hospitalization course. Discharge summary done for completion of medical records. ________________________________________________________________         Condition at Discharge:  Stable  __________________________________________________________________    Disposition: Transferred to [facility] by cardiology for higher level of care       No face-to-face encounter done for this note.   Nonbillable note    Signed:  Álvaro Gutierrez MD   2/8/23

## 2023-02-08 NOTE — PROGRESS NOTES
Report given to transport staff.  Patient to be transported to [facility].

## 2023-02-24 ENCOUNTER — OFFICE VISIT (OUTPATIENT)
Dept: INTERNAL MEDICINE CLINIC | Age: 53
End: 2023-02-24
Payer: COMMERCIAL

## 2023-02-24 VITALS
BODY MASS INDEX: 37.15 KG/M2 | WEIGHT: 265.4 LBS | RESPIRATION RATE: 16 BRPM | HEIGHT: 71 IN | HEART RATE: 54 BPM | SYSTOLIC BLOOD PRESSURE: 128 MMHG | TEMPERATURE: 98 F | OXYGEN SATURATION: 97 % | DIASTOLIC BLOOD PRESSURE: 70 MMHG

## 2023-02-24 DIAGNOSIS — I25.10 CORONARY ARTERY DISEASE INVOLVING NATIVE CORONARY ARTERY OF NATIVE HEART WITHOUT ANGINA PECTORIS: ICD-10-CM

## 2023-02-24 DIAGNOSIS — Z79.02 ANTIPLATELET OR ANTITHROMBOTIC LONG-TERM USE: ICD-10-CM

## 2023-02-24 DIAGNOSIS — Z79.899 ON STATIN THERAPY: ICD-10-CM

## 2023-02-24 DIAGNOSIS — I25.2 HISTORY OF MI (MYOCARDIAL INFARCTION): ICD-10-CM

## 2023-02-24 DIAGNOSIS — I10 ESSENTIAL HYPERTENSION: Primary | ICD-10-CM

## 2023-02-24 PROBLEM — Z79.01 CURRENT USE OF LONG TERM ANTICOAGULATION: Status: ACTIVE | Noted: 2023-02-24

## 2023-02-24 PROCEDURE — 3074F SYST BP LT 130 MM HG: CPT | Performed by: NURSE PRACTITIONER

## 2023-02-24 PROCEDURE — 3078F DIAST BP <80 MM HG: CPT | Performed by: NURSE PRACTITIONER

## 2023-02-24 PROCEDURE — 99214 OFFICE O/P EST MOD 30 MIN: CPT | Performed by: NURSE PRACTITIONER

## 2023-02-24 RX ORDER — TICAGRELOR 90 MG/1
90 TABLET ORAL 2 TIMES DAILY
COMMUNITY
Start: 2023-02-09

## 2023-02-24 NOTE — PROGRESS NOTES
Harshad Tang is a 45 y.o. male     Chief Complaint   Patient presents with    Stress     6 wk follow up       Visit Vitals  /70   Pulse (!) 54   Temp 98 °F (36.7 °C)   Resp 16   Ht 5' 11\" (1.803 m)   Wt 265 lb 6.4 oz (120.4 kg)   SpO2 97%   BMI 37.02 kg/m²       Health Maintenance Due   Topic Date Due    DTaP/Tdap/Td series (1 - Tdap) Never done    Shingles Vaccine (1 of 2) Never done    COVID-19 Vaccine (5 - Booster for Pfizer series) 08/15/2022         1. \"Have you been to the ER, urgent care clinic since your last visit? Hospitalized since your last visit? \" Yes When: 2/6/23-2/7/23 Where: [facility] Reason for visit: NSTEMI    2. \"Have you seen or consulted any other health care providers outside of the [address] [place] [address] since your last visit? \"  Yes seen Cardiology. 3. For patients aged 39-70: Has the patient had a colonoscopy / FIT/ Cologuard? Yes - no Care Gap present      If the patient is female:    4. For patients aged 41-77: Has the patient had a mammogram within the past 2 years? NA - based on age or sex      11. For patients aged 21-65: Has the patient had a pap smear?  NA - based on age or sex

## 2023-02-24 NOTE — PROGRESS NOTES
Chief Complaint   Patient presents with    Stress     6 wk follow up       SUBJECTIVE:    Byron Clancy is a 45 y.o. male who is here today for a follow up appointment regarding recent non-STEMI which occurred on 02/06/2023, and subsequently had a heart cath procedure performed the following day. After that time, the patient was started on Brilinta 90 mg twice daily which she has been tolerating well. He continues to take his medications as prescribed for management of his hypertension. His blood pressure appears well controlled. He denies any recent episodes of chest pain, chest pressure, shortness of breath, headaches, dizziness, blurred vision, palpitations, or syncope episodes. He continues to take pitavastatin 4 mg daily for management of his cholesterol. Current Outpatient Medications   Medication Sig Dispense Refill    Brilinta 90 mg tablet Take 90 mg by mouth two (2) times a day. hydroCHLOROthiazide (HYDRODIURIL) 12.5 mg tablet TAKE ONE TABLET BY MOUTH ONE TIME DAILY FOR HIGH BLOOD PRESSURE 90 Tablet 1    nebivoloL (BYSTOLIC) 10 mg tablet TAKE ONE TABLET BY MOUTH EVERY MORNING FOR HIGH BLOOD PRESSURE 90 Tablet 1    buPROPion XL (WELLBUTRIN XL) 150 mg tablet TAKE ONE TABLET BY MOUTH EVERY MORNING 90 Tablet 1    LORazepam (ATIVAN) 0.5 mg tablet Take 1 Tablet by mouth nightly as needed for Anxiety (SLEEP). Max Daily Amount: 0.5 mg. 60 Tablet 1    Dupixent Pen 300 mg/2 mL pnij       fluticasone propionate (FLONASE) 50 mcg/actuation nasal spray 2 Sprays by Both Nostrils route daily. 1 Each 5    aspirin delayed-release 81 mg tablet Take 81 mg by mouth in the morning.      hydrOXYzine HCL (ATARAX) 25 mg tablet       ezetimibe (ZETIA) 10 mg tablet Take 10 mg by mouth.      losartan (COZAAR) 100 mg tablet Take 100 mg by mouth daily. pitavastatin calcium (LIVALO) 4 mg tab tablet Take 4 mg by mouth daily.        Past Medical History:   Diagnosis Date    HTN (hypertension)      Past Surgical History:   Procedure Laterality Date    HX HEART CATHETERIZATION       No Known Allergies    REVIEW OF SYSTEMS:                                        POSITIVE= bold text  Negative = regular text    General:                     fever, chills, sweats, generalized weakness, weight loss/gain,                                       loss of appetite   Eyes:                           blurred vision, eye pain, loss of vision, double vision  ENT:                            rhinorrhea, pharyngitis   Respiratory:               cough, sputum production, SOB, GRANADOS, wheezing, pleuritic pain   Cardiology:                chest pain, palpitations, orthopnea, PND, edema, syncope   Gastrointestinal:       abdominal pain , N/V, diarrhea, dysphagia, constipation, bleeding   Genitourinary:           frequency, urgency, dysuria, hematuria, incontinence   Muskuloskeletal :      arthralgia, myalgia, back pain  Hematology:              easy bruising, nose or gum bleeding, lymphadenopathy   Dermatological:         rash, ulceration, pruritis, color change / jaundice  Endocrine:                 hot flashes or polydipsia   Neurological:             headache, dizziness, confusion, focal weakness, paresthesia,                                      Speech difficulties, memory loss, gait difficulty  Psychological:          Feelings of anxiety, depression, agitation        Social History     Socioeconomic History    Marital status:    Tobacco Use    Smoking status: Never    Smokeless tobacco: Never   Vaping Use    Vaping Use: Never used   Substance and Sexual Activity    Alcohol use:  Yes     Alcohol/week: 3.0 standard drinks     Types: 3 Shots of liquor per week     Comment: occ    Drug use: Never    Sexual activity: Yes     Social Determinants of Health     Financial Resource Strain: Low Risk     Difficulty of Paying Living Expenses: Not hard at all   Food Insecurity: No Food Insecurity    Worried About [facility] in the Last Year: Never true    Ran Out of Food in the Last Year: Never true     History reviewed. No pertinent family history. OBJECTIVE:     Visit Vitals  /70   Pulse (!) 54   Temp 98 °F (36.7 °C)   Resp 16   Ht 5' 11\" (1.803 m)   Wt 265 lb 6.4 oz (120.4 kg)   SpO2 97%   BMI 37.02 kg/m²       Constitutional: He appears well nourished, of stated age, and dressed appropriately. Eyes: Sclera anicteric, PERRLA, EOMI  Respiratory: Clear to ascultation X5, normal inspiratory effort, no adventitious breath sounds. Cardiovascular: Regular rate and rhythm, no rubs or gallops, PMI not displaced, No thrills. Neuro: Non-focal exam, A & O X 3.   Psychiatric: Appropriate affect and demeanor, pleasant and cooperative. Patient's thought content and thought processing appear to be within normal limits. ASSESSMENT/PLAN:     ICD-10-CM ICD-9-CM    1. Essential hypertension  I10 401.9       2. History of MI (myocardial infarction)  I25.2 412       3. On statin therapy  Z79.899 V58.69       4. Coronary artery disease involving native coronary artery of native heart without angina pectoris  I25.10 414.01       5. Antiplatelet or antithrombotic long-term use  Z79.02 V58.63         1: Patient to continue current medications for management of hypertension. Continue to monitor blood pressures at home. Blood pressure appears stable. 2: Patient to continue Brilinta 90 mg twice daily as prescribed. 3: Continue Livalo 4 mg daily for management of cholesterol. 4: Follow-up with cardiology as planned. 5: Continue healthy lifestyle management and appropriate dietary intake. Avoid red meat and excess. Orders Placed This Encounter    Brilinta 90 mg tablet         ATTENTION:   This medical record was transcribed using an electronic medical records system. Although proofread, it may and can contain electronic and spelling errors. Other human spelling and other errors may be present. Corrections may be executed at a later time.   Please feel free to contact us for any clarifications as needed. Follow-up and Dispositions    Return in about 4 months (around 6/24/2023) for Follow up with fasting labs. Signed,  Gene Lopes.  Chere Lanes, MSN APRN FNP-BC

## 2023-05-30 RX ORDER — BUPROPION HYDROCHLORIDE 150 MG/1
TABLET ORAL
Qty: 90 TABLET | Refills: 1 | Status: SHIPPED | OUTPATIENT
Start: 2023-05-30 | End: 2023-06-21 | Stop reason: DRUGHIGH

## 2023-05-30 RX ORDER — NEBIVOLOL 10 MG/1
TABLET ORAL
Qty: 90 TABLET | Refills: 1 | Status: SHIPPED | OUTPATIENT
Start: 2023-05-30

## 2023-06-05 NOTE — ED NOTES
Assumed care of patient. Verbal shift change report given to [facility] (oncoming nurse) by Ayo Helms (offgoing nurse). Report included the following information SBAR, ED Summary, and MAR. Pt in bed resting quietly with no complaints at this time. patient

## 2023-06-21 ENCOUNTER — OFFICE VISIT (OUTPATIENT)
Facility: CLINIC | Age: 53
End: 2023-06-21
Payer: COMMERCIAL

## 2023-06-21 VITALS
HEIGHT: 71 IN | BODY MASS INDEX: 37.83 KG/M2 | HEART RATE: 60 BPM | RESPIRATION RATE: 16 BRPM | TEMPERATURE: 98.2 F | WEIGHT: 270.2 LBS | DIASTOLIC BLOOD PRESSURE: 76 MMHG | OXYGEN SATURATION: 96 % | SYSTOLIC BLOOD PRESSURE: 134 MMHG

## 2023-06-21 DIAGNOSIS — Z00.00 ROUTINE PHYSICAL EXAMINATION: Primary | ICD-10-CM

## 2023-06-21 DIAGNOSIS — I10 ESSENTIAL HYPERTENSION: ICD-10-CM

## 2023-06-21 DIAGNOSIS — I25.2 OLD MYOCARDIAL INFARCTION: ICD-10-CM

## 2023-06-21 DIAGNOSIS — E66.01 CLASS 2 SEVERE OBESITY DUE TO EXCESS CALORIES WITH SERIOUS COMORBIDITY AND BODY MASS INDEX (BMI) OF 37.0 TO 37.9 IN ADULT (HCC): ICD-10-CM

## 2023-06-21 DIAGNOSIS — I25.10 ATHEROSCLEROSIS OF NATIVE CORONARY ARTERY OF NATIVE HEART WITHOUT ANGINA PECTORIS: ICD-10-CM

## 2023-06-21 DIAGNOSIS — E78.2 MIXED HYPERLIPIDEMIA: ICD-10-CM

## 2023-06-21 DIAGNOSIS — Z79.899 ON STATIN THERAPY: ICD-10-CM

## 2023-06-21 DIAGNOSIS — F43.0 STRESS REACTION: ICD-10-CM

## 2023-06-21 DIAGNOSIS — F32.1 CURRENT MODERATE EPISODE OF MAJOR DEPRESSIVE DISORDER WITHOUT PRIOR EPISODE (HCC): ICD-10-CM

## 2023-06-21 DIAGNOSIS — Z12.5 SCREENING FOR MALIGNANT NEOPLASM OF PROSTATE: ICD-10-CM

## 2023-06-21 DIAGNOSIS — Z79.02 LONG TERM (CURRENT) USE OF ANTITHROMBOTICS/ANTIPLATELETS: ICD-10-CM

## 2023-06-21 PROBLEM — E66.812 CLASS 2 SEVERE OBESITY DUE TO EXCESS CALORIES WITH SERIOUS COMORBIDITY AND BODY MASS INDEX (BMI) OF 37.0 TO 37.9 IN ADULT: Status: ACTIVE | Noted: 2022-01-25

## 2023-06-21 PROCEDURE — 3075F SYST BP GE 130 - 139MM HG: CPT | Performed by: NURSE PRACTITIONER

## 2023-06-21 PROCEDURE — 99396 PREV VISIT EST AGE 40-64: CPT | Performed by: NURSE PRACTITIONER

## 2023-06-21 PROCEDURE — 3078F DIAST BP <80 MM HG: CPT | Performed by: NURSE PRACTITIONER

## 2023-06-21 RX ORDER — BUPROPION HYDROCHLORIDE 300 MG/1
300 TABLET ORAL EVERY MORNING
Qty: 30 TABLET | Refills: 3 | Status: SHIPPED | OUTPATIENT
Start: 2023-06-21

## 2023-06-21 SDOH — ECONOMIC STABILITY: FOOD INSECURITY: WITHIN THE PAST 12 MONTHS, YOU WORRIED THAT YOUR FOOD WOULD RUN OUT BEFORE YOU GOT MONEY TO BUY MORE.: NEVER TRUE

## 2023-06-21 SDOH — ECONOMIC STABILITY: INCOME INSECURITY: HOW HARD IS IT FOR YOU TO PAY FOR THE VERY BASICS LIKE FOOD, HOUSING, MEDICAL CARE, AND HEATING?: NOT HARD AT ALL

## 2023-06-21 SDOH — ECONOMIC STABILITY: HOUSING INSECURITY
IN THE LAST 12 MONTHS, WAS THERE A TIME WHEN YOU DID NOT HAVE A STEADY PLACE TO SLEEP OR SLEPT IN A SHELTER (INCLUDING NOW)?: NO

## 2023-06-21 SDOH — ECONOMIC STABILITY: FOOD INSECURITY: WITHIN THE PAST 12 MONTHS, THE FOOD YOU BOUGHT JUST DIDN'T LAST AND YOU DIDN'T HAVE MONEY TO GET MORE.: NEVER TRUE

## 2023-06-21 NOTE — PROGRESS NOTES
Ike Botello is a 45 y.o. male     Chief Complaint   Patient presents with    Annual Exam       /76 (Site: Left Upper Arm, Position: Sitting, Cuff Size: Large Adult)   Pulse 60   Temp 98.2 °F (36.8 °C) (Oral)   Resp 16   Ht 5' 11\" (1.803 m)   Wt 270 lb 3.2 oz (122.6 kg)   SpO2 96%   BMI 37.69 kg/m²     Health Maintenance Due   Topic Date Due    HIV screen  Never done    DTaP/Tdap/Td vaccine (1 - Tdap) Never done    Shingles vaccine (1 of 2) Never done    COVID-19 Vaccine (5 - Booster for Pfizer series) 08/15/2022         1. \"Have you been to the ER, urgent care clinic since your last visit? Hospitalized since your last visit? \" No     2. \"Have you seen or consulted any other health care providers outside of the [address] [place] [address] since your last visit? \" Yes seen ENT     3. For patients aged 39-70: Has the patient had a colonoscopy / FIT/ Cologuard? Yes      If the patient is female:    4. For patients aged 41-77: Has the patient had a mammogram within the past 2 years? N/A      5. For patients aged 21-65: Has the patient had a pap smear?  N/A

## 2023-06-21 NOTE — PROGRESS NOTES
Chief Complaint   Patient presents with    Annual Exam       SUBJECTIVE:    Agnieszka Collazo is a 45 y.o. male who is here today for a routine physical exam as well as follow up appointment regarding current medical conditions including: MDD, HTN, CAD, old MI, obesity, mixed hyperlipidemia, and long-term use of statin therapy and antiplatelet therapy. Admittedly, the patient states she has been having some worsening depression over the past several months. He cites this as being related to loss of job as well as difficulty finding another job. He is not in a position to leave the area to find work due to his son being enrolled in school. He would like to try to find something locally if possible. He is frustrated about his situation. He continues to take Wellbutrin  mg daily, but feels that it has not been working as well as it once did. He continues to take nebivolol, losartan, and hydrochlorothiazide for management of his hypertension. His blood pressure has remained stable for some time now. He denies any adverse side effects of the medication. He is also taking a combination of pitavastatin and ezetimibe for management of his mixed hyperlipidemia. He has been tolerating these well. He denies any recent episodes of chest pain, chest pressure, shortness of breath, dizziness, blurred vision, palpitations, or syncope episodes. He states he is walking fairly regularly, but not daily. He continues to use his Brilinta twice daily as prescribed.       Current Outpatient Medications   Medication Sig Dispense Refill    buPROPion (WELLBUTRIN XL) 300 MG extended release tablet Take 1 tablet by mouth every morning 30 tablet 3    nebivolol (BYSTOLIC) 10 MG tablet TAKE ONE TABLET BY MOUTH EVERY MORNING FOR HIGH BLOOD PRESSURE 90 tablet 1    aspirin 81 MG EC tablet Take 1 tablet by mouth daily      dupilumab (DUPIXENT) 300 MG/2ML SOPN injection ceived the following from [facility]:

## 2023-06-22 LAB
ALBUMIN SERPL-MCNC: 4.4 G/DL (ref 3.8–4.9)
ALBUMIN/GLOB SERPL: 2.1 {RATIO} (ref 1.2–2.2)
ALP SERPL-CCNC: 63 IU/L (ref 44–121)
ALT SERPL-CCNC: 20 IU/L (ref 0–44)
APPEARANCE UR: CLEAR
AST SERPL-CCNC: 18 IU/L (ref 0–40)
BILIRUB SERPL-MCNC: 0.8 MG/DL (ref 0–1.2)
BILIRUB UR QL STRIP: NEGATIVE
BUN SERPL-MCNC: 16 MG/DL (ref 6–24)
BUN/CREAT SERPL: 16 (ref 9–20)
CALCIUM SERPL-MCNC: 9.4 MG/DL (ref 8.7–10.2)
CHLORIDE SERPL-SCNC: 105 MMOL/L (ref 96–106)
CHOLEST SERPL-MCNC: 140 MG/DL (ref 100–199)
CO2 SERPL-SCNC: 21 MMOL/L (ref 20–29)
COLOR UR: YELLOW
CREAT SERPL-MCNC: 1 MG/DL (ref 0.76–1.27)
EGFRCR SERPLBLD CKD-EPI 2021: 89 ML/MIN/1.73
ERYTHROCYTE [DISTWIDTH] IN BLOOD BY AUTOMATED COUNT: 12.5 % (ref 11.6–15.4)
GLOBULIN SER CALC-MCNC: 2.1 G/DL (ref 1.5–4.5)
GLUCOSE SERPL-MCNC: 94 MG/DL (ref 70–99)
GLUCOSE UR QL STRIP: NEGATIVE
HCT VFR BLD AUTO: 43.9 % (ref 37.5–51)
HDLC SERPL-MCNC: 40 MG/DL
HGB BLD-MCNC: 15 G/DL (ref 13–17.7)
HGB UR QL STRIP: NEGATIVE
KETONES UR QL STRIP: NEGATIVE
LDLC SERPL CALC-MCNC: 75 MG/DL (ref 0–99)
LEUKOCYTE ESTERASE UR QL STRIP: NEGATIVE
MCH RBC QN AUTO: 30.8 PG (ref 26.6–33)
MCHC RBC AUTO-ENTMCNC: 34.2 G/DL (ref 31.5–35.7)
MCV RBC AUTO: 90 FL (ref 79–97)
NITRITE UR QL STRIP: NEGATIVE
PH UR STRIP: 5.5 [PH] (ref 5–7.5)
PLATELET # BLD AUTO: 146 X10E3/UL (ref 150–450)
POTASSIUM SERPL-SCNC: 3.8 MMOL/L (ref 3.5–5.2)
PROT SERPL-MCNC: 6.5 G/DL (ref 6–8.5)
PROT UR QL STRIP: NEGATIVE
PSA SERPL-MCNC: 4.1 NG/ML (ref 0–4)
RBC # BLD AUTO: 4.87 X10E6/UL (ref 4.14–5.8)
SODIUM SERPL-SCNC: 144 MMOL/L (ref 134–144)
SP GR UR STRIP: 1.02 (ref 1–1.03)
TRIGL SERPL-MCNC: 144 MG/DL (ref 0–149)
TSH SERPL DL<=0.005 MIU/L-ACNC: 2.17 UIU/ML (ref 0.45–4.5)
UROBILINOGEN UR STRIP-MCNC: 0.2 MG/DL (ref 0.2–1)
VLDLC SERPL CALC-MCNC: 25 MG/DL (ref 5–40)
WBC # BLD AUTO: 5.2 X10E3/UL (ref 3.4–10.8)

## 2023-08-20 ENCOUNTER — PATIENT MESSAGE (OUTPATIENT)
Facility: CLINIC | Age: 53
End: 2023-08-20

## 2023-08-24 RX ORDER — SEMAGLUTIDE 0.25 MG/.5ML
0.25 INJECTION, SOLUTION SUBCUTANEOUS
Qty: 2 ML | Refills: 0 | Status: SHIPPED | OUTPATIENT
Start: 2023-08-24

## 2023-08-24 NOTE — TELEPHONE ENCOUNTER
Patient has been working on 1800-calorie or less diet per day. He has been exercising regularly. This has been ongoing for over 6 months now without significant weight loss.

## 2024-01-17 DIAGNOSIS — F32.1 CURRENT MODERATE EPISODE OF MAJOR DEPRESSIVE DISORDER WITHOUT PRIOR EPISODE (HCC): ICD-10-CM

## 2024-01-17 NOTE — TELEPHONE ENCOUNTER
PCP: Fredy Garcia APRN - NP    Last appt: 12/21/2023    Future Appointments   Date Time Provider Department Center   6/21/2024  8:30 AM Fredy Garcia APRN - NP [facility]       Requested Prescriptions     Pending Prescriptions Disp Refills    buPROPion (WELLBUTRIN XL) 300 MG extended release tablet [Pharmacy Med Name: BUPROPION  MG TAB[*]] 30 tablet 3     Sig: TAKE ONE TABLET BY MOUTH EVERY MORNING      
parent(s)

## 2024-01-18 RX ORDER — BUPROPION HYDROCHLORIDE 300 MG/1
300 TABLET ORAL EVERY MORNING
Qty: 90 TABLET | Refills: 1 | Status: SHIPPED | OUTPATIENT
Start: 2024-01-18

## 2024-01-25 ENCOUNTER — PATIENT MESSAGE (OUTPATIENT)
Facility: CLINIC | Age: 54
End: 2024-01-25

## 2024-01-25 DIAGNOSIS — E66.01 CLASS 2 SEVERE OBESITY DUE TO EXCESS CALORIES WITH SERIOUS COMORBIDITY AND BODY MASS INDEX (BMI) OF 37.0 TO 37.9 IN ADULT (HCC): Primary | ICD-10-CM

## 2024-01-25 NOTE — TELEPHONE ENCOUNTER
Loni Cassidy LPN 1/25/2024 11:40 AM EST      ----- Message -----  From: Frederic Jacob \"Bill\"  Sent: 1/25/2024 10:43 AM EST  To: *  Subject: Bill Bartlett does mail order for a 84 day supply.

## 2024-01-29 RX ORDER — NEBIVOLOL 10 MG/1
TABLET ORAL
Qty: 90 TABLET | Refills: 1 | Status: SHIPPED | OUTPATIENT
Start: 2024-01-29

## 2024-01-29 NOTE — TELEPHONE ENCOUNTER
PCP: Fredy Garcia APRN - NP    Last appt: 12/21/2023    Future Appointments   Date Time Provider Department Center   6/21/2024  8:30 AM Fredy Garcia APRN - NP [facility]       Requested Prescriptions     Pending Prescriptions Disp Refills    nebivolol (BYSTOLIC) 10 MG tablet [Pharmacy Med Name: NEBIVOLOL 10 MG TABLET] 90 tablet 1     Sig: TAKE ONE TABLET BY MOUTH ONE TIME DAILY IN THE MORNING FOR HIGH BLOOD PRESSURE

## 2024-02-03 DIAGNOSIS — F41.9 ANXIETY: Primary | ICD-10-CM

## 2024-02-03 DIAGNOSIS — G47.9 SLEEP DISTURBANCE: ICD-10-CM

## 2024-02-05 RX ORDER — LORAZEPAM 0.5 MG/1
TABLET ORAL
Qty: 90 TABLET | Refills: 0 | Status: SHIPPED | OUTPATIENT
Start: 2024-02-05 | End: 2024-05-05

## 2024-02-05 NOTE — TELEPHONE ENCOUNTER
Last Refill: 1-6-23  Last Visit: 12/21/2023   Next Visit: 6/21/2024     Requested Prescriptions     Pending Prescriptions Disp Refills    LORazepam (ATIVAN) 0.5 MG tablet [Pharmacy Med Name: LORAZEPAM 0.5 MG TAB] 60 tablet 1     Sig: TAKE ONE TABLET BY MOUTH EVERY NIGHT AS NEEDED FOR ANXIETY

## 2024-02-08 RX ORDER — SEMAGLUTIDE 0.25 MG/.5ML
0.25 INJECTION, SOLUTION SUBCUTANEOUS
Qty: 2 ML | Refills: 0 | Status: SHIPPED | OUTPATIENT
Start: 2024-02-08

## 2024-02-23 ENCOUNTER — OFFICE VISIT (OUTPATIENT)
Facility: CLINIC | Age: 54
End: 2024-02-23

## 2024-02-23 VITALS
SYSTOLIC BLOOD PRESSURE: 124 MMHG | BODY MASS INDEX: 38.5 KG/M2 | DIASTOLIC BLOOD PRESSURE: 80 MMHG | RESPIRATION RATE: 19 BRPM | TEMPERATURE: 98.5 F | HEIGHT: 71 IN | OXYGEN SATURATION: 98 % | WEIGHT: 275 LBS | HEART RATE: 62 BPM

## 2024-02-23 DIAGNOSIS — S39.011S STRAIN OF ABDOMINAL MUSCLE, SEQUELA: Primary | ICD-10-CM

## 2024-02-23 ASSESSMENT — PATIENT HEALTH QUESTIONNAIRE - PHQ9
6. FEELING BAD ABOUT YOURSELF - OR THAT YOU ARE A FAILURE OR HAVE LET YOURSELF OR YOUR FAMILY DOWN: 0
SUM OF ALL RESPONSES TO PHQ QUESTIONS 1-9: 0
3. TROUBLE FALLING OR STAYING ASLEEP: 0
7. TROUBLE CONCENTRATING ON THINGS, SUCH AS READING THE NEWSPAPER OR WATCHING TELEVISION: 0
SUM OF ALL RESPONSES TO PHQ QUESTIONS 1-9: 0
SUM OF ALL RESPONSES TO PHQ9 QUESTIONS 1 & 2: 0
SUM OF ALL RESPONSES TO PHQ QUESTIONS 1-9: 0
SUM OF ALL RESPONSES TO PHQ QUESTIONS 1-9: 0
2. FEELING DOWN, DEPRESSED OR HOPELESS: 0
10. IF YOU CHECKED OFF ANY PROBLEMS, HOW DIFFICULT HAVE THESE PROBLEMS MADE IT FOR YOU TO DO YOUR WORK, TAKE CARE OF THINGS AT HOME, OR GET ALONG WITH OTHER PEOPLE: 0
4. FEELING TIRED OR HAVING LITTLE ENERGY: 0
8. MOVING OR SPEAKING SO SLOWLY THAT OTHER PEOPLE COULD HAVE NOTICED. OR THE OPPOSITE, BEING SO FIGETY OR RESTLESS THAT YOU HAVE BEEN MOVING AROUND A LOT MORE THAN USUAL: 0
5. POOR APPETITE OR OVEREATING: 0
1. LITTLE INTEREST OR PLEASURE IN DOING THINGS: 0
9. THOUGHTS THAT YOU WOULD BE BETTER OFF DEAD, OR OF HURTING YOURSELF: 0

## 2024-02-23 NOTE — PROGRESS NOTES
Frederic Jacob is a 53 y.o. male     Chief Complaint   Patient presents with    Follow-up     ER F/U       /80 (Site: Right Upper Arm, Position: Sitting, Cuff Size: Large Adult)   Pulse 62   Temp 98.5 °F (36.9 °C) (Oral)   Resp 19   Ht 1.803 m (5' 11\")   Wt 124.7 kg (275 lb)   SpO2 98%   BMI 38.35 kg/m²     Health Maintenance Due   Topic Date Due    Hepatitis B vaccine (1 of 3 - 3-dose series) Never done    Shingles vaccine (1 of 2) Never done    Flu vaccine (1) 08/01/2023    COVID-19 Vaccine (5 - 2023-24 season) 09/01/2023    Depression Monitoring  02/24/2024         \"Have you been to the ER, urgent care clinic since your last visit?  Hospitalized since your last visit?\"    Yes, rigoberto 2/15/24; hernia    “Have you seen or consulted any other health care providers outside of [facility] since your last visit?”    NO

## 2024-02-23 NOTE — PROGRESS NOTES
Chief Complaint   Patient presents with    Follow-up     ER F/U       SUBJECTIVE:    Frederic Jacob is a 53 y.o. male who is here today for a follow up appointment after recent emergency room visit on 02/15/2024 for complaints of right upper abdominal pain.    The patient states he had been engaging in quite a bit of heavy lifting due to moving his household to a new home, and had developed a bruise across the upper abdomen which became quite painful.  States the pain was sharp and searing and quite tender to touch.  He was seen at an urgent care initially, and then referred to ER due to concerns of possible \"internal bleeding.\"  He states he was seen in the emergency room, had imaging studies and evaluation, and was subsequently released.  He states he has had no significant pain, and the pain he previously had has dissipated gradually since then.  He denies any sequelae from the incident.    Current Outpatient Medications   Medication Sig Dispense Refill    Semaglutide-Weight Management (WEGOVY) 0.25 MG/0.5ML SOAJ SC injection Inject 0.25 mg into the skin every 7 days 2 mL 0    LORazepam (ATIVAN) 0.5 MG tablet TAKE ONE TABLET BY MOUTH EVERY NIGHT AS NEEDED FOR ANXIETY 90 tablet 0    nebivolol (BYSTOLIC) 10 MG tablet TAKE ONE TABLET BY MOUTH ONE TIME DAILY IN THE MORNING FOR HIGH BLOOD PRESSURE 90 tablet 1    buPROPion (WELLBUTRIN XL) 300 MG extended release tablet TAKE ONE TABLET BY MOUTH EVERY MORNING 90 tablet 1    aspirin 81 MG EC tablet Take 1 tablet by mouth daily      dupilumab (DUPIXENT) 300 MG/2ML SOPN injection ceived the following from [facility]: Outside name: Dupixent Pen 300 mg/2 mL pnij      ezetimibe (ZETIA) 10 MG tablet Take 1 tablet by mouth      fluticasone (FLONASE) 50 MCG/ACT nasal spray 2 sprays by Nasal route daily      hydroCHLOROthiazide (HYDRODIURIL) 12.5 MG tablet TAKE ONE TABLET BY MOUTH ONE TIME DAILY FOR HIGH BLOOD PRESSURE      losartan (COZAAR) 100 MG tablet Take

## 2024-03-10 DIAGNOSIS — F32.1 CURRENT MODERATE EPISODE OF MAJOR DEPRESSIVE DISORDER WITHOUT PRIOR EPISODE (HCC): ICD-10-CM

## 2024-03-11 RX ORDER — BUPROPION HYDROCHLORIDE 300 MG/1
300 TABLET ORAL EVERY MORNING
Qty: 90 TABLET | Refills: 1 | Status: SHIPPED | OUTPATIENT
Start: 2024-03-11

## 2024-03-11 NOTE — TELEPHONE ENCOUNTER
PCP: Fredy Garcia APRN - NP    Last appt: 2/23/2024    Future Appointments   Date Time Provider Department Center   6/21/2024  8:30 AM Fredy Garcia APRN - NP [facility]       Requested Prescriptions     Pending Prescriptions Disp Refills    buPROPion (WELLBUTRIN XL) 300 MG extended release tablet [Pharmacy Med Name: BUPROPION  MG TAB[*]] 90 tablet 1     Sig: TAKE ONE TABLET BY MOUTH EVERY MORNING

## 2024-04-02 RX ORDER — HYDROCHLOROTHIAZIDE 12.5 MG/1
TABLET ORAL
Qty: 90 TABLET | Refills: 1 | Status: SHIPPED | OUTPATIENT
Start: 2024-04-02

## 2024-04-02 NOTE — TELEPHONE ENCOUNTER
PCP: Fredy Garcia APRN - NP    Last appt: 2/23/2024    Future Appointments   Date Time Provider Department Center   6/21/2024  8:30 AM Fredy Garcia APRN - NP [facility]       Requested Prescriptions     Pending Prescriptions Disp Refills    hydroCHLOROthiazide 12.5 MG tablet [Pharmacy Med Name: HYDROCHLOROTHIAZIDE 12.5 MG TAB] 90 tablet 1     Sig: TAKE ONE TABLET BY MOUTH ONE TIME DAILY FOR HIGH BLOOD PRESSURE

## 2024-04-22 ENCOUNTER — PATIENT MESSAGE (OUTPATIENT)
Facility: CLINIC | Age: 54
End: 2024-04-22

## 2024-04-22 DIAGNOSIS — R05.9 COUGH, UNSPECIFIED TYPE: Primary | ICD-10-CM

## 2024-04-23 ENCOUNTER — OFFICE VISIT (OUTPATIENT)
Facility: CLINIC | Age: 54
End: 2024-04-23
Payer: COMMERCIAL

## 2024-04-23 VITALS
HEART RATE: 66 BPM | SYSTOLIC BLOOD PRESSURE: 124 MMHG | RESPIRATION RATE: 16 BRPM | BODY MASS INDEX: 37.35 KG/M2 | HEIGHT: 71 IN | WEIGHT: 266.8 LBS | DIASTOLIC BLOOD PRESSURE: 84 MMHG | TEMPERATURE: 98.1 F | OXYGEN SATURATION: 96 %

## 2024-04-23 DIAGNOSIS — J20.9 ACUTE BRONCHITIS, UNSPECIFIED ORGANISM: ICD-10-CM

## 2024-04-23 DIAGNOSIS — R06.2 WHEEZING: ICD-10-CM

## 2024-04-23 DIAGNOSIS — J01.10 ACUTE FRONTAL SINUSITIS, RECURRENCE NOT SPECIFIED: Primary | ICD-10-CM

## 2024-04-23 DIAGNOSIS — R05.8 PRODUCTIVE COUGH: ICD-10-CM

## 2024-04-23 PROCEDURE — 3079F DIAST BP 80-89 MM HG: CPT | Performed by: NURSE PRACTITIONER

## 2024-04-23 PROCEDURE — 99213 OFFICE O/P EST LOW 20 MIN: CPT | Performed by: NURSE PRACTITIONER

## 2024-04-23 PROCEDURE — 3074F SYST BP LT 130 MM HG: CPT | Performed by: NURSE PRACTITIONER

## 2024-04-23 RX ORDER — PREDNISONE 10 MG/1
10 TABLET ORAL SEE ADMIN INSTRUCTIONS
Qty: 1 EACH | Refills: 0 | Status: SHIPPED | OUTPATIENT
Start: 2024-04-23

## 2024-04-23 RX ORDER — AMOXICILLIN AND CLAVULANATE POTASSIUM 875; 125 MG/1; MG/1
1 TABLET, FILM COATED ORAL 2 TIMES DAILY
Qty: 20 TABLET | Refills: 0 | Status: SHIPPED | OUTPATIENT
Start: 2024-04-23 | End: 2024-05-03

## 2024-04-23 NOTE — PROGRESS NOTES
Frederic Jacob is a 53 y.o. male     Chief Complaint   Patient presents with    URI     Cough congestion sore throat started Friday.       /84 (Site: Left Upper Arm, Position: Sitting, Cuff Size: Medium Adult)   Pulse 66   Temp 98.1 °F (36.7 °C) (Oral)   Resp 16   Ht 1.803 m (5' 11\")   Wt 121 kg (266 lb 12.8 oz)   SpO2 96%   BMI 37.21 kg/m²     Health Maintenance Due   Topic Date Due    Hepatitis B vaccine (1 of 3 - 3-dose series) Never done    Shingles vaccine (1 of 2) Never done    COVID-19 Vaccine (5 - 2023-24 season) 09/01/2023         \"Have you been to the ER, urgent care clinic since your last visit?  Hospitalized since your last visit?\"    NO    “Have you seen or consulted any other health care providers outside of [facility] since your last visit?”    NO                     
Use    Smoking status: Never     Passive exposure: Never    Smokeless tobacco: Never   Vaping Use    Vaping Use: Never used   Substance and Sexual Activity    Alcohol use: Yes     Alcohol/week: 3.0 standard drinks of alcohol    Drug use: Never    Sexual activity: Yes     Partners: Female     Social Determinants of Health     Financial Resource Strain: Low Risk  (6/21/2023)    Overall Financial Resource Strain (CARDIA)     Difficulty of Paying Living Expenses: Not hard at all   Transportation Needs: Unknown (6/21/2023)    PRAPARE - Transportation     Lack of Transportation (Non-Medical): No   Housing Stability: Unknown (6/21/2023)    Housing Stability Vital Sign     Unstable Housing in the Last Year: No     History reviewed. No pertinent family history.    OBJECTIVE:     /84 (Site: Left Upper Arm, Position: Sitting, Cuff Size: Medium Adult)   Pulse 66   Temp 98.1 °F (36.7 °C) (Oral)   Resp 16   Ht 1.803 m (5' 11\")   Wt 121 kg (266 lb 12.8 oz)   SpO2 96%   BMI 37.21 kg/m²     Constitutional: He appears well nourished, of stated age, and dressed appropriately.  Eyes: Sclera anicteric, PERRLA, EOMI  ENT: Nares clear, red boggy turbinates bilaterally with reddened oropharynx.  No pustules present.  Neck: Supple without lymphadenopathy.  Respiratory: Turbulence to ascultation X5, normal inspiratory effort, no crackles or sounds of consolidation..  Cardiovascular: Regular rate and rhythm, no rubs or gallops, PMI not displaced, No thrills, no peripheral edema    ASSESSMENT/PLAN:    Diagnosis Orders   1. Acute frontal sinusitis, recurrence not specified  amoxicillin-clavulanate (AUGMENTIN) 875-125 MG per tablet    predniSONE 10 MG (21) TBPK      2. Acute bronchitis, unspecified organism  amoxicillin-clavulanate (AUGMENTIN) 875-125 MG per tablet    predniSONE 10 MG (21) TBPK      3. Productive cough        4. Wheezing  predniSONE 10 MG (21) TBPK        1: Patient will be prescribed Augmentin 875/125 twice daily x

## 2024-04-30 RX ORDER — DEXTROMETHORPHAN HYDROBROMIDE AND PROMETHAZINE HYDROCHLORIDE 15; 6.25 MG/5ML; MG/5ML
5 SYRUP ORAL 4 TIMES DAILY PRN
Qty: 180 ML | Refills: 0 | Status: SHIPPED | OUTPATIENT
Start: 2024-04-30 | End: 2024-05-09

## 2024-04-30 NOTE — TELEPHONE ENCOUNTER
Kurt Blake MA 4/30/2024 1:31 PM EDT      ----- Message -----  From: Frederic Jacob \"Bill\"  Sent: 4/30/2024 10:10 AM EDT  To: *  Subject: Covid     Demetrius

## 2024-06-21 ENCOUNTER — OFFICE VISIT (OUTPATIENT)
Facility: CLINIC | Age: 54
End: 2024-06-21
Payer: COMMERCIAL

## 2024-06-21 VITALS
TEMPERATURE: 98.6 F | HEIGHT: 71 IN | WEIGHT: 259.6 LBS | DIASTOLIC BLOOD PRESSURE: 74 MMHG | SYSTOLIC BLOOD PRESSURE: 116 MMHG | BODY MASS INDEX: 36.34 KG/M2 | HEART RATE: 78 BPM | OXYGEN SATURATION: 95 % | RESPIRATION RATE: 16 BRPM

## 2024-06-21 DIAGNOSIS — I25.2 OLD MYOCARDIAL INFARCTION: ICD-10-CM

## 2024-06-21 DIAGNOSIS — R97.20 BPH WITH ELEVATED PSA: ICD-10-CM

## 2024-06-21 DIAGNOSIS — Z00.00 ROUTINE PHYSICAL EXAMINATION: Primary | ICD-10-CM

## 2024-06-21 DIAGNOSIS — F32.4 MAJOR DEPRESSIVE DISORDER WITH SINGLE EPISODE, IN PARTIAL REMISSION (HCC): ICD-10-CM

## 2024-06-21 DIAGNOSIS — E66.01 CLASS 2 SEVERE OBESITY DUE TO EXCESS CALORIES WITH SERIOUS COMORBIDITY AND BODY MASS INDEX (BMI) OF 36.0 TO 36.9 IN ADULT (HCC): ICD-10-CM

## 2024-06-21 DIAGNOSIS — Z79.82 LONG TERM CURRENT USE OF ASPIRIN: ICD-10-CM

## 2024-06-21 DIAGNOSIS — I25.10 ATHEROSCLEROSIS OF NATIVE CORONARY ARTERY OF NATIVE HEART WITHOUT ANGINA PECTORIS: ICD-10-CM

## 2024-06-21 DIAGNOSIS — Z95.5 HISTORY OF HEART ARTERY STENT: ICD-10-CM

## 2024-06-21 DIAGNOSIS — E78.2 MIXED HYPERLIPIDEMIA: ICD-10-CM

## 2024-06-21 DIAGNOSIS — I10 ESSENTIAL HYPERTENSION: ICD-10-CM

## 2024-06-21 DIAGNOSIS — N40.0 BPH WITH ELEVATED PSA: ICD-10-CM

## 2024-06-21 LAB
ALBUMIN SERPL-MCNC: 4.4 G/DL (ref 3.5–5)
ALBUMIN/GLOB SERPL: 1.7 (ref 1.1–2.2)
ALP SERPL-CCNC: 64 U/L (ref 45–117)
ALT SERPL-CCNC: 47 U/L (ref 12–78)
ANION GAP SERPL CALC-SCNC: 6 MMOL/L (ref 5–15)
APPEARANCE UR: CLEAR
AST SERPL-CCNC: 38 U/L (ref 15–37)
BACTERIA URNS QL MICRO: NEGATIVE /HPF
BASOPHILS # BLD: 0 K/UL (ref 0–0.1)
BASOPHILS NFR BLD: 1 % (ref 0–1)
BILIRUB SERPL-MCNC: 0.7 MG/DL (ref 0.2–1)
BILIRUB UR QL CFM: NEGATIVE
BUN SERPL-MCNC: 15 MG/DL (ref 6–20)
BUN/CREAT SERPL: 13 (ref 12–20)
CALCIUM SERPL-MCNC: 9.3 MG/DL (ref 8.5–10.1)
CHLORIDE SERPL-SCNC: 108 MMOL/L (ref 97–108)
CHOLEST SERPL-MCNC: 123 MG/DL
CO2 SERPL-SCNC: 26 MMOL/L (ref 21–32)
COLOR UR: ABNORMAL
CREAT SERPL-MCNC: 1.14 MG/DL (ref 0.7–1.3)
DIFFERENTIAL METHOD BLD: ABNORMAL
EOSINOPHIL # BLD: 0.1 K/UL (ref 0–0.4)
EOSINOPHIL NFR BLD: 2 % (ref 0–7)
EPITH CASTS URNS QL MICRO: ABNORMAL /LPF
ERYTHROCYTE [DISTWIDTH] IN BLOOD BY AUTOMATED COUNT: 13.4 % (ref 11.5–14.5)
GLOBULIN SER CALC-MCNC: 2.6 G/DL (ref 2–4)
GLUCOSE SERPL-MCNC: 84 MG/DL (ref 65–100)
GLUCOSE UR STRIP.AUTO-MCNC: NEGATIVE MG/DL
HCT VFR BLD AUTO: 43.7 % (ref 36.6–50.3)
HDLC SERPL-MCNC: 39 MG/DL
HDLC SERPL: 3.2 (ref 0–5)
HGB BLD-MCNC: 15.1 G/DL (ref 12.1–17)
HGB UR QL STRIP: NEGATIVE
IMM GRANULOCYTES # BLD AUTO: 0 K/UL (ref 0–0.04)
IMM GRANULOCYTES NFR BLD AUTO: 0 % (ref 0–0.5)
KETONES UR QL STRIP.AUTO: ABNORMAL MG/DL
LDLC SERPL CALC-MCNC: 63.6 MG/DL (ref 0–100)
LEUKOCYTE ESTERASE UR QL STRIP.AUTO: NEGATIVE
LYMPHOCYTES # BLD: 1.2 K/UL (ref 0.8–3.5)
LYMPHOCYTES NFR BLD: 25 % (ref 12–49)
MCH RBC QN AUTO: 31.7 PG (ref 26–34)
MCHC RBC AUTO-ENTMCNC: 34.6 G/DL (ref 30–36.5)
MCV RBC AUTO: 91.6 FL (ref 80–99)
MONOCYTES # BLD: 0.5 K/UL (ref 0–1)
MONOCYTES NFR BLD: 9 % (ref 5–13)
MUCOUS THREADS URNS QL MICRO: ABNORMAL /LPF
NEUTS SEG # BLD: 3.1 K/UL (ref 1.8–8)
NEUTS SEG NFR BLD: 63 % (ref 32–75)
NITRITE UR QL STRIP.AUTO: NEGATIVE
NRBC # BLD: 0 K/UL (ref 0–0.01)
NRBC BLD-RTO: 0 PER 100 WBC
PH UR STRIP: 5.5 (ref 5–8)
PLATELET # BLD AUTO: 137 K/UL (ref 150–400)
PMV BLD AUTO: 12 FL (ref 8.9–12.9)
POTASSIUM SERPL-SCNC: 4.3 MMOL/L (ref 3.5–5.1)
PROT SERPL-MCNC: 7 G/DL (ref 6.4–8.2)
PROT UR STRIP-MCNC: ABNORMAL MG/DL
PSA SERPL-MCNC: 10.8 NG/ML (ref 0.01–4)
RBC # BLD AUTO: 4.77 M/UL (ref 4.1–5.7)
RBC #/AREA URNS HPF: ABNORMAL /HPF (ref 0–5)
SODIUM SERPL-SCNC: 140 MMOL/L (ref 136–145)
SP GR UR REFRACTOMETRY: 1.03 (ref 1–1.03)
TRIGL SERPL-MCNC: 102 MG/DL
TSH SERPL DL<=0.05 MIU/L-ACNC: 1.74 UIU/ML (ref 0.36–3.74)
UROBILINOGEN UR QL STRIP.AUTO: 1 EU/DL (ref 0.2–1)
VLDLC SERPL CALC-MCNC: 20.4 MG/DL
WBC # BLD AUTO: 5 K/UL (ref 4.1–11.1)
WBC URNS QL MICRO: ABNORMAL /HPF (ref 0–4)

## 2024-06-21 PROCEDURE — 99396 PREV VISIT EST AGE 40-64: CPT | Performed by: NURSE PRACTITIONER

## 2024-06-21 PROCEDURE — 3078F DIAST BP <80 MM HG: CPT | Performed by: NURSE PRACTITIONER

## 2024-06-21 PROCEDURE — 3074F SYST BP LT 130 MM HG: CPT | Performed by: NURSE PRACTITIONER

## 2024-06-21 SDOH — ECONOMIC STABILITY: INCOME INSECURITY: HOW HARD IS IT FOR YOU TO PAY FOR THE VERY BASICS LIKE FOOD, HOUSING, MEDICAL CARE, AND HEATING?: NOT HARD AT ALL

## 2024-06-21 SDOH — ECONOMIC STABILITY: FOOD INSECURITY: WITHIN THE PAST 12 MONTHS, THE FOOD YOU BOUGHT JUST DIDN'T LAST AND YOU DIDN'T HAVE MONEY TO GET MORE.: NEVER TRUE

## 2024-06-21 SDOH — ECONOMIC STABILITY: FOOD INSECURITY: WITHIN THE PAST 12 MONTHS, YOU WORRIED THAT YOUR FOOD WOULD RUN OUT BEFORE YOU GOT MONEY TO BUY MORE.: NEVER TRUE

## 2024-06-21 NOTE — PROGRESS NOTES
Chief Complaint   Patient presents with    Annual Exam       SUBJECTIVE:    Frederic Jacob is a 53 y.o. male who is here today for a routine physical examination as well as follow up appointment regarding current medical conditions including: Depression, severe obesity, CAD, old MI, HTN, mixed hyperlipidemia, BPH with elevated PSA, and history of stent of coronary artery.  He states he is feeling fairly well overall and denies any new or acute complaints.    The patient remains on Wellbutrin  mg daily for management of his depression.  His symptoms have been stable and denies any adverse side effects of the medication.    He remains on hydrochlorothiazide, nebivolol, and losartan for blood pressure management.  His blood pressure has remained stable and in good control overall.  He is currently on ezetimibe daily for management of his cholesterol and tolerating this well.  He denies any recent episodes of chest pain, chest pressure, shortness of breath, headaches, dizziness, blurred vision, palpitations, or syncope episodes.    The patient was also found to have an elevated PSA on prior labs in December and referred to urology for evaluation.  He has undergone prostate biopsy which revealed no signs of cancer.  He was also placed on tamsulosin 0.4 mg daily which has been helpful for his urinary symptoms.  He denies any hematuria.      Current Outpatient Medications   Medication Sig Dispense Refill    hydroCHLOROthiazide 12.5 MG tablet TAKE ONE TABLET BY MOUTH ONE TIME DAILY FOR HIGH BLOOD PRESSURE 90 tablet 1    buPROPion (WELLBUTRIN XL) 300 MG extended release tablet TAKE ONE TABLET BY MOUTH EVERY MORNING 90 tablet 1    nebivolol (BYSTOLIC) 10 MG tablet TAKE ONE TABLET BY MOUTH ONE TIME DAILY IN THE MORNING FOR HIGH BLOOD PRESSURE 90 tablet 1    aspirin 81 MG EC tablet Take 1 tablet by mouth daily      dupilumab (DUPIXENT) 300 MG/2ML SOPN injection ceived the following from [facility]:

## 2024-06-21 NOTE — PROGRESS NOTES
Frederic Jacob is a 53 y.o. male     Chief Complaint   Patient presents with    Annual Exam       /74 (Site: Left Upper Arm, Position: Sitting, Cuff Size: Medium Adult)   Pulse 78   Temp 98.6 °F (37 °C) (Oral)   Resp 16   Ht 1.803 m (5' 11\")   Wt 117.8 kg (259 lb 9.6 oz)   SpO2 95%   BMI 36.21 kg/m²     Health Maintenance Due   Topic Date Due    Hepatitis B vaccine (1 of 3 - 3-dose series) Never done    Shingles vaccine (1 of 2) Never done    COVID-19 Vaccine (5 - 2023-24 season) 09/01/2023         \"Have you been to the ER, urgent care clinic since your last visit?  Hospitalized since your last visit?\"    NO    “Have you seen or consulted any other health care providers outside of [facility] since your last visit?”    NO

## 2024-07-26 RX ORDER — HYDROCHLOROTHIAZIDE 12.5 MG/1
TABLET ORAL
Qty: 90 TABLET | Refills: 1 | Status: SHIPPED | OUTPATIENT
Start: 2024-07-26

## 2024-07-26 NOTE — TELEPHONE ENCOUNTER
PCP: Fredy Garcia APRN - NP    Last appt: 6/21/2024    Future Appointments   Date Time Provider Department Center   12/20/2024 10:00 AM Fredy Garcia APRN - NP [facility]       Requested Prescriptions     Pending Prescriptions Disp Refills    hydroCHLOROthiazide 12.5 MG tablet [Pharmacy Med Name: HYDROCHLOROTHIAZIDE 12.5 MG TAB] 90 tablet 1     Sig: TAKE ONE TABLET BY MOUTH ONE TIME DAILY FOR HIGH BLOOD PRESSURE

## 2024-08-06 DIAGNOSIS — I10 ESSENTIAL HYPERTENSION: Primary | ICD-10-CM

## 2024-08-06 RX ORDER — NEBIVOLOL 10 MG/1
TABLET ORAL
Qty: 90 TABLET | Refills: 0 | Status: SHIPPED | OUTPATIENT
Start: 2024-08-06

## 2024-08-06 NOTE — TELEPHONE ENCOUNTER
PCP: Fredy Garcia APRN - NP    Last appt: 6/21/2024    Future Appointments   Date Time Provider Department Center   12/20/2024 10:00 AM Fredy Garcia APRN - NP [facility] DEP       Requested Prescriptions     Pending Prescriptions Disp Refills    nebivolol (BYSTOLIC) 10 MG tablet [Pharmacy Med Name: NEBIVOLOL 10 MG TABLET] 90 tablet 0     Sig: TAKE ONE TABLET BY MOUTH ONE TIME DAILY IN THE MORNING FOR HIGH BLOOD PRESSURE

## 2024-10-23 DIAGNOSIS — I10 ESSENTIAL HYPERTENSION: ICD-10-CM

## 2024-10-24 RX ORDER — NEBIVOLOL 10 MG/1
TABLET ORAL
Qty: 90 TABLET | Refills: 1 | Status: SHIPPED | OUTPATIENT
Start: 2024-10-24

## 2024-10-24 NOTE — TELEPHONE ENCOUNTER
PCP: Fredy Garcia APRN - NP    Last appt: 6/21/2024    Future Appointments   Date Time Provider Department Center   12/20/2024 10:00 AM Fredy Garcia APRN - NP [facility] DEP       Requested Prescriptions     Pending Prescriptions Disp Refills    nebivolol (BYSTOLIC) 10 MG tablet [Pharmacy Med Name: NEBIVOLOL 10 MG TABLET] 90 tablet 0     Sig: TAKE ONE TABLET BY MOUTH EVERY MORNING FOR BLOOD PRESSURE

## 2024-11-02 ENCOUNTER — PATIENT MESSAGE (OUTPATIENT)
Facility: CLINIC | Age: 54
End: 2024-11-02

## 2024-11-04 RX ORDER — LOSARTAN POTASSIUM 100 MG/1
100 TABLET ORAL DAILY
Qty: 90 TABLET | Refills: 1 | Status: SHIPPED | OUTPATIENT
Start: 2024-11-04

## 2024-11-04 NOTE — TELEPHONE ENCOUNTER
PCP: Fredy Garcia APRN - NP    Last appt: 6/21/2024    Future Appointments   Date Time Provider Department Center   12/20/2024 10:00 AM Fredy Garcia APRN - NP [facility] DEP       Requested Prescriptions     Pending Prescriptions Disp Refills    losartan (COZAAR) 100 MG tablet 90 tablet 1     Sig: Take 1 tablet by mouth daily

## 2024-11-07 ENCOUNTER — OFFICE VISIT (OUTPATIENT)
Facility: CLINIC | Age: 54
End: 2024-11-07
Payer: COMMERCIAL

## 2024-11-07 VITALS
OXYGEN SATURATION: 94 % | BODY MASS INDEX: 36.92 KG/M2 | RESPIRATION RATE: 65 BRPM | SYSTOLIC BLOOD PRESSURE: 121 MMHG | HEIGHT: 71 IN | HEART RATE: 65 BPM | WEIGHT: 263.7 LBS | DIASTOLIC BLOOD PRESSURE: 75 MMHG | TEMPERATURE: 99.2 F

## 2024-11-07 DIAGNOSIS — J20.9 ACUTE BRONCHITIS, UNSPECIFIED ORGANISM: Primary | ICD-10-CM

## 2024-11-07 DIAGNOSIS — I10 ESSENTIAL HYPERTENSION: ICD-10-CM

## 2024-11-07 PROCEDURE — 99214 OFFICE O/P EST MOD 30 MIN: CPT | Performed by: STUDENT IN AN ORGANIZED HEALTH CARE EDUCATION/TRAINING PROGRAM

## 2024-11-07 PROCEDURE — 3078F DIAST BP <80 MM HG: CPT | Performed by: STUDENT IN AN ORGANIZED HEALTH CARE EDUCATION/TRAINING PROGRAM

## 2024-11-07 PROCEDURE — 3074F SYST BP LT 130 MM HG: CPT | Performed by: STUDENT IN AN ORGANIZED HEALTH CARE EDUCATION/TRAINING PROGRAM

## 2024-11-07 RX ORDER — ALBUTEROL SULFATE 90 UG/1
2 INHALANT RESPIRATORY (INHALATION) 4 TIMES DAILY PRN
Qty: 54 G | Refills: 1 | Status: SHIPPED | OUTPATIENT
Start: 2024-11-07

## 2024-11-07 RX ORDER — BENZONATATE 200 MG/1
200 CAPSULE ORAL 3 TIMES DAILY PRN
Qty: 30 CAPSULE | Refills: 0 | Status: SHIPPED | OUTPATIENT
Start: 2024-11-07 | End: 2024-11-14

## 2024-11-07 NOTE — PROGRESS NOTES
\"Have you been to the ER, urgent care clinic since your last visit?  Hospitalized since your last visit?\"    NO    “Have you seen or consulted any other health care providers outside our system since your last visit?”    NO               
so slowly that other people could have noticed. Or the opposite - being so fidgety or restless that you have been moving around a lot more than usual 0   Thoughts that you would be better off dead, or of hurting yourself in some way 0   PHQ-2 Score 0   PHQ-9 Total Score 0   If you checked off any problems, how difficult have these problems made it for you to do your work, take care of things at home, or get along with other people? 0        Assessment/Plan:     1. Acute bronchitis, unspecified organism  Assessment & Plan:  Onset of symptoms 5 days ago. Will start tessalon pearls and albuterol inhaler. Recommended supportive care with increased fluids/humidity/12-hour decongestant nasal spray twice daily for a maximum of 4 days/1 tsp honey for the cough prn/Tylenol as needed/Ibuprofen as needed/Steam inhalation for sinus pressure. Recheck if not improved in one week or sooner if worsening.  Orders:  -     benzonatate (TESSALON) 200 MG capsule; Take 1 capsule by mouth 3 times daily as needed for Cough, Disp-30 capsule, R-0Normal  -     albuterol sulfate HFA (VENTOLIN HFA) 108 (90 Base) MCG/ACT inhaler; Inhale 2 puffs into the lungs 4 times daily as needed for Wheezing, Disp-54 g, R-1Normal  2. Essential hypertension  Assessment & Plan:   - BP in office today 121/75  - At goal BP < 140/90, continue current BP medication regimen.          Follow-up and Dispositions:  Return if symptoms worsen or fail to improve.       I have reviewed the patient's medical history in detail and updated the computerized patient record.      We had a prolonged discussion about these complex clinical issues and went over the various important aspects to consider. All questions were answered.      Advised the patient to call back or return to office if symptoms do not improve, change in nature, or persist.     The patient was given an after visit summary or informed of "[employer] Access which includes patient instructions, diagnoses, current

## 2024-11-07 NOTE — PATIENT INSTRUCTIONS
Managing Symptoms of Upper Respiratory Infections (URI) for Adults     You can expect the symptoms of your cold or upper respiratory infection to last 14 to 21 days.   A dry hacking cough may continue up to three or four weeks.   To help you recover: Drink more fluids. Get enough rest. Use a humidifier or increase time in a steamy shower.     Keep in mind that green or yellow secretion do not equal bacterial infection.     Additional recommendations for managing your symptoms:     Fever, headache, or pain   ? Acetaminophen (Tylenol™) 325 mg 2 tablets every 6 hours as needed for the first 5-7 days of infection.   ? Acetaminophen (Tylenol™) 500 mg, 2 tablets every 8 hours as needed for the first 5-7 days of infection.    Maximum dose: 3000 mg of acetaminophen in 24 hours.    Avoid combination products that contain acetaminophen (read the label) while taking scheduled acetaminophen.    Use lowest effective dose for the shortest possible duration to reduce the risk of serious adverse effects.   ? Ibuprofen (Advil™, Motrin™) 200 mg, 3 tablets every 6 hours-8 hours.    Avoid ibuprofen if you have kidney disease, coronary heart disease, heart failure, or history of a gastric ulcer or gastric surgery    Maximum dose: 2400 mg of ibuprofen in 24 hours.  Use lowest needed dose for the shortest possible time frame to reduce the risk of serious side effects. Do not use longer than 7 days, unless directed by your health care provider.  Take with food to prevent getting an upset stomach.     Sore throat   ? Take acetaminophen and/or ibuprofen as above.   ? Use throat lozenges with benzocaine which help numb your sore throat (Cepacol®, chloraseptic brands).   ? Gargle with saltwater several times a day to help relieve throat pain. Mix 1/4 teaspoon (1.4 grams) of table salt in 8 ounces (237 milliliters) of warm water. Gargle the solution and then spit it out.     Sinus drainage, sinus/nose/ear congestion (nose drainage, drainage in

## 2024-12-20 ENCOUNTER — OFFICE VISIT (OUTPATIENT)
Facility: CLINIC | Age: 54
End: 2024-12-20
Payer: COMMERCIAL

## 2024-12-20 VITALS
DIASTOLIC BLOOD PRESSURE: 84 MMHG | SYSTOLIC BLOOD PRESSURE: 133 MMHG | TEMPERATURE: 98.2 F | HEIGHT: 71 IN | OXYGEN SATURATION: 94 % | HEART RATE: 70 BPM | BODY MASS INDEX: 37.77 KG/M2 | WEIGHT: 269.8 LBS

## 2024-12-20 DIAGNOSIS — I25.2 OLD MYOCARDIAL INFARCTION: ICD-10-CM

## 2024-12-20 DIAGNOSIS — F32.4 MAJOR DEPRESSIVE DISORDER WITH SINGLE EPISODE, IN PARTIAL REMISSION (HCC): Primary | ICD-10-CM

## 2024-12-20 DIAGNOSIS — I10 ESSENTIAL HYPERTENSION: ICD-10-CM

## 2024-12-20 DIAGNOSIS — E66.812 CLASS 2 SEVERE OBESITY DUE TO EXCESS CALORIES WITH SERIOUS COMORBIDITY AND BODY MASS INDEX (BMI) OF 37.0 TO 37.9 IN ADULT: ICD-10-CM

## 2024-12-20 DIAGNOSIS — E66.01 CLASS 2 SEVERE OBESITY DUE TO EXCESS CALORIES WITH SERIOUS COMORBIDITY AND BODY MASS INDEX (BMI) OF 37.0 TO 37.9 IN ADULT: ICD-10-CM

## 2024-12-20 DIAGNOSIS — E78.2 MIXED HYPERLIPIDEMIA: ICD-10-CM

## 2024-12-20 DIAGNOSIS — I25.10 ATHEROSCLEROSIS OF NATIVE CORONARY ARTERY OF NATIVE HEART WITHOUT ANGINA PECTORIS: ICD-10-CM

## 2024-12-20 LAB
ALBUMIN SERPL-MCNC: 4.3 G/DL (ref 3.5–5)
ALBUMIN/GLOB SERPL: 1.7 (ref 1.1–2.2)
ALP SERPL-CCNC: 54 U/L (ref 45–117)
ALT SERPL-CCNC: 32 U/L (ref 12–78)
ANION GAP SERPL CALC-SCNC: 7 MMOL/L (ref 2–12)
AST SERPL-CCNC: 29 U/L (ref 15–37)
BILIRUB SERPL-MCNC: 0.6 MG/DL (ref 0.2–1)
BUN SERPL-MCNC: 20 MG/DL (ref 6–20)
BUN/CREAT SERPL: 22 (ref 12–20)
CALCIUM SERPL-MCNC: 9.8 MG/DL (ref 8.5–10.1)
CHLORIDE SERPL-SCNC: 108 MMOL/L (ref 97–108)
CHOLEST SERPL-MCNC: 129 MG/DL
CO2 SERPL-SCNC: 26 MMOL/L (ref 21–32)
CREAT SERPL-MCNC: 0.93 MG/DL (ref 0.7–1.3)
ERYTHROCYTE [DISTWIDTH] IN BLOOD BY AUTOMATED COUNT: 12.8 % (ref 11.5–14.5)
GLOBULIN SER CALC-MCNC: 2.5 G/DL (ref 2–4)
GLUCOSE SERPL-MCNC: 81 MG/DL (ref 65–100)
HCT VFR BLD AUTO: 50.5 % (ref 36.6–50.3)
HDLC SERPL-MCNC: 41 MG/DL
HDLC SERPL: 3.1 (ref 0–5)
HGB BLD-MCNC: 16.7 G/DL (ref 12.1–17)
LDLC SERPL CALC-MCNC: 67.8 MG/DL (ref 0–100)
MCH RBC QN AUTO: 30.6 PG (ref 26–34)
MCHC RBC AUTO-ENTMCNC: 33.1 G/DL (ref 30–36.5)
MCV RBC AUTO: 92.7 FL (ref 80–99)
NRBC # BLD: 0 K/UL (ref 0–0.01)
NRBC BLD-RTO: 0 PER 100 WBC
PLATELET # BLD AUTO: 158 K/UL (ref 150–400)
PMV BLD AUTO: 12.7 FL (ref 8.9–12.9)
POTASSIUM SERPL-SCNC: 4 MMOL/L (ref 3.5–5.1)
PROT SERPL-MCNC: 6.8 G/DL (ref 6.4–8.2)
RBC # BLD AUTO: 5.45 M/UL (ref 4.1–5.7)
SODIUM SERPL-SCNC: 141 MMOL/L (ref 136–145)
TRIGL SERPL-MCNC: 101 MG/DL
VLDLC SERPL CALC-MCNC: 20.2 MG/DL
WBC # BLD AUTO: 6.1 K/UL (ref 4.1–11.1)

## 2024-12-20 PROCEDURE — 3075F SYST BP GE 130 - 139MM HG: CPT | Performed by: NURSE PRACTITIONER

## 2024-12-20 PROCEDURE — 99214 OFFICE O/P EST MOD 30 MIN: CPT | Performed by: NURSE PRACTITIONER

## 2024-12-20 PROCEDURE — 3079F DIAST BP 80-89 MM HG: CPT | Performed by: NURSE PRACTITIONER

## 2024-12-20 NOTE — PROGRESS NOTES
Chief Complaint   Patient presents with    Follow-up     6 month follow up - fasting       SUBJECTIVE:    Frederic Jacob is a 54 y.o. male who is here today for a follow up appointment regarding current medical conditions including: MDD, HTN, mixed hyperlipidemia, CAD, history of MI, and severe obesity.  He states he is feeling relatively well.  He is fasting today.    The patient remains on bupropion  mg daily for management of his depression.  He has been tolerating medication well and states that it has been effective overall.  He denies any adverse side effects of the medication.  He feels his symptoms are well-controlled and stable at this time.    He remains on a combination of losartan, nebivolol, and hydrochlorothiazide for management of his hypertension.  His blood pressures have remained stable and in good control.  He denies any adverse side effects of the medication.  He continues to take ezetimibe for management of his cholesterol and tolerates this well.  He denies any recent episodes of chest pain, chest pressure, shortness of breath, headaches, dizziness, blurred vision, palpitations, or syncope episodes.  He states he is trying to be watchful of his diet.    Current Outpatient Medications   Medication Sig Dispense Refill    losartan (COZAAR) 100 MG tablet Take 1 tablet by mouth daily 90 tablet 1    nebivolol (BYSTOLIC) 10 MG tablet TAKE ONE TABLET BY MOUTH EVERY MORNING FOR BLOOD PRESSURE 90 tablet 1    hydroCHLOROthiazide 12.5 MG tablet TAKE ONE TABLET BY MOUTH ONE TIME DAILY FOR HIGH BLOOD PRESSURE 90 tablet 1    buPROPion (WELLBUTRIN XL) 300 MG extended release tablet TAKE ONE TABLET BY MOUTH EVERY MORNING 90 tablet 1    aspirin 81 MG EC tablet Take 1 tablet by mouth daily      dupilumab (DUPIXENT) 300 MG/2ML SOPN injection ceived the following from [facility]: Outside name: Dupixent Pen 300 mg/2 mL pnij      ezetimibe (ZETIA) 10 MG tablet Take 1 tablet by mouth

## 2024-12-20 NOTE — PROGRESS NOTES
\"Have you been to the ER, urgent care clinic since your last visit?  Hospitalized since your last visit?\"    Yes, went to [facility] after a hunting accident, broke his right hand, no pain, not wearing a cast.     “Have you seen or consulted any other health care providers outside our system since your last visit?”    Yes, had a follow up with an ortho doctor for his right hand.

## 2025-01-11 DIAGNOSIS — F32.1 CURRENT MODERATE EPISODE OF MAJOR DEPRESSIVE DISORDER WITHOUT PRIOR EPISODE (HCC): ICD-10-CM

## 2025-01-13 ENCOUNTER — PATIENT MESSAGE (OUTPATIENT)
Facility: CLINIC | Age: 55
End: 2025-01-13

## 2025-01-13 DIAGNOSIS — I25.10 ATHEROSCLEROSIS OF NATIVE CORONARY ARTERY OF NATIVE HEART WITHOUT ANGINA PECTORIS: Primary | ICD-10-CM

## 2025-01-13 DIAGNOSIS — E78.2 MIXED HYPERLIPIDEMIA: ICD-10-CM

## 2025-01-13 RX ORDER — BUPROPION HYDROCHLORIDE 300 MG/1
300 TABLET ORAL EVERY MORNING
Qty: 90 TABLET | Refills: 1 | Status: SHIPPED | OUTPATIENT
Start: 2025-01-13

## 2025-01-13 NOTE — TELEPHONE ENCOUNTER
PCP: Fredy Garcia APRN - NP    Last appt: 12/20/2024    Future Appointments   Date Time Provider Department Center   6/26/2025  9:00 AM Fredy Garcia APRN - NP [facility] DEP       Requested Prescriptions     Pending Prescriptions Disp Refills    buPROPion (WELLBUTRIN XL) 300 MG extended release tablet [Pharmacy Med Name: BUPROPION  MG TAB[**]] 90 tablet 1     Sig: TAKE ONE TABLET BY MOUTH EVERY MORNING

## 2025-01-14 RX ORDER — EZETIMIBE 10 MG/1
10 TABLET ORAL DAILY
Qty: 90 TABLET | Refills: 1 | Status: SHIPPED | OUTPATIENT
Start: 2025-01-14 | End: 2025-01-16 | Stop reason: SDUPTHER

## 2025-01-14 NOTE — TELEPHONE ENCOUNTER
PCP: Fredy Garcia APRN - NP    Last appt: 12/20/2024  Future Appointments   Date Time Provider Department Center   6/26/2025  9:00 AM Fredy Garcia APRN - NP [facility]       Requested Prescriptions     Pending Prescriptions Disp Refills    ezetimibe (ZETIA) 10 MG tablet 30 tablet 1     Sig: Take 1 tablet by mouth daily       Prior labs and Blood pressures:  BP Readings from Last 3 Encounters:   12/20/24 133/84   11/07/24 121/75   06/21/24 116/74     Lab Results   Component Value Date/Time     12/20/2024 10:40 AM    K 4.0 12/20/2024 10:40 AM     12/20/2024 10:40 AM    CO2 26 12/20/2024 10:40 AM    BUN 20 12/20/2024 10:40 AM    GFRAA 93 01/25/2022 10:12 AM     No results found for: \"HBA1C\", \"[id number]\"  Lab Results   Component Value Date/Time    CHOL 129 12/20/2024 10:40 AM    HDL 41 12/20/2024 10:40 AM    LDL 67.8 12/20/2024 10:40 AM    LDL 51 12/21/2023 12:00 AM    VLDL 20.2 12/20/2024 10:40 AM    VLDL 16 08/04/2022 12:00 AM     No results found for: \"VITD3\"        Lab Results   Component Value Date/Time    TSH 1.74 06/21/2024 09:06 AM

## 2025-01-16 DIAGNOSIS — E78.2 MIXED HYPERLIPIDEMIA: ICD-10-CM

## 2025-01-16 DIAGNOSIS — I25.10 ATHEROSCLEROSIS OF NATIVE CORONARY ARTERY OF NATIVE HEART WITHOUT ANGINA PECTORIS: ICD-10-CM

## 2025-01-16 RX ORDER — EZETIMIBE 10 MG/1
10 TABLET ORAL DAILY
Qty: 90 TABLET | Refills: 1 | Status: SHIPPED | OUTPATIENT
Start: 2025-01-16

## 2025-01-16 NOTE — TELEPHONE ENCOUNTER
PCP: Fredy Garcia APRN - NP    Last appt: 12/20/2024  Future Appointments   Date Time Provider Department Center   6/26/2025  9:00 AM Fredy Garcia APRN - NP [facility]       Requested Prescriptions     Pending Prescriptions Disp Refills    ezetimibe (ZETIA) 10 MG tablet 90 tablet 1     Sig: Take 1 tablet by mouth daily       Prior labs and Blood pressures:  BP Readings from Last 3 Encounters:   12/20/24 133/84   11/07/24 121/75   06/21/24 116/74     Lab Results   Component Value Date/Time     12/20/2024 10:40 AM    K 4.0 12/20/2024 10:40 AM     12/20/2024 10:40 AM    CO2 26 12/20/2024 10:40 AM    BUN 20 12/20/2024 10:40 AM    GFRAA 93 01/25/2022 10:12 AM     No results found for: \"HBA1C\", \"[id number]\"  Lab Results   Component Value Date/Time    CHOL 129 12/20/2024 10:40 AM    HDL 41 12/20/2024 10:40 AM    LDL 67.8 12/20/2024 10:40 AM    LDL 51 12/21/2023 12:00 AM    VLDL 20.2 12/20/2024 10:40 AM    VLDL 16 08/04/2022 12:00 AM     No results found for: \"VITD3\"        Lab Results   Component Value Date/Time    TSH 1.74 06/21/2024 09:06 AM        Yes

## 2025-01-27 NOTE — TELEPHONE ENCOUNTER
PCP: Fredy Garcia APRN - NP    Last appt: 12/20/2024    Future Appointments   Date Time Provider Department Center   6/26/2025  9:00 AM Fredy Garcia APRN - NP [facility]       Requested Prescriptions     Pending Prescriptions Disp Refills    hydroCHLOROthiazide 12.5 MG tablet [Pharmacy Med Name: HYDROCHLOROTHIAZIDE 12.5 MG TAB] 90 tablet 1     Sig: TAKE ONE TABLET BY MOUTH ONE TIME DAILY FOR HIGH BLOOD PRESSURE

## 2025-01-28 RX ORDER — HYDROCHLOROTHIAZIDE 12.5 MG/1
TABLET ORAL
Qty: 90 TABLET | Refills: 1 | Status: SHIPPED | OUTPATIENT
Start: 2025-01-28

## 2025-02-04 DIAGNOSIS — G47.9 SLEEP DISTURBANCE: ICD-10-CM

## 2025-02-04 DIAGNOSIS — F41.9 ANXIETY: Primary | ICD-10-CM

## 2025-02-04 RX ORDER — LORAZEPAM 0.5 MG/1
TABLET ORAL
Qty: 90 TABLET | Refills: 0 | Status: SHIPPED | OUTPATIENT
Start: 2025-02-04 | End: 2025-05-05

## 2025-02-04 NOTE — TELEPHONE ENCOUNTER
PCP: Fredy Garcia APRN - NP    Last appt: 12/20/2024    Future Appointments   Date Time Provider Department Center   6/26/2025  9:00 AM Fredy Garcia APRN - NP [facility] DE[facility]       Requested Prescriptions     Pending Prescriptions Disp Refills    LORazepam (ATIVAN) 0.5 MG tablet [Pharmacy Med Name: LORAZEPAM 0.5 MG TAB] 90 tablet 0     Sig: TAKE ONE TABLET BY MOUTH EVERY NIGHT AS NEEDED FOR ANXIETY

## 2025-04-21 ENCOUNTER — OFFICE VISIT (OUTPATIENT)
Facility: CLINIC | Age: 55
End: 2025-04-21
Payer: COMMERCIAL

## 2025-04-21 VITALS
WEIGHT: 262 LBS | OXYGEN SATURATION: 95 % | RESPIRATION RATE: 16 BRPM | BODY MASS INDEX: 36.68 KG/M2 | DIASTOLIC BLOOD PRESSURE: 74 MMHG | SYSTOLIC BLOOD PRESSURE: 116 MMHG | TEMPERATURE: 99.6 F | HEIGHT: 71 IN | HEART RATE: 79 BPM

## 2025-04-21 DIAGNOSIS — J20.9 ACUTE BRONCHITIS, UNSPECIFIED ORGANISM: Primary | ICD-10-CM

## 2025-04-21 DIAGNOSIS — J01.90 ACUTE SINUSITIS, RECURRENCE NOT SPECIFIED, UNSPECIFIED LOCATION: ICD-10-CM

## 2025-04-21 PROCEDURE — 3078F DIAST BP <80 MM HG: CPT | Performed by: NURSE PRACTITIONER

## 2025-04-21 PROCEDURE — 3074F SYST BP LT 130 MM HG: CPT | Performed by: NURSE PRACTITIONER

## 2025-04-21 PROCEDURE — 96372 THER/PROPH/DIAG INJ SC/IM: CPT | Performed by: NURSE PRACTITIONER

## 2025-04-21 PROCEDURE — 99214 OFFICE O/P EST MOD 30 MIN: CPT | Performed by: NURSE PRACTITIONER

## 2025-04-21 RX ORDER — METHYLPREDNISOLONE ACETATE 40 MG/ML
20 INJECTION, SUSPENSION INTRA-ARTICULAR; INTRALESIONAL; INTRAMUSCULAR; SOFT TISSUE ONCE
Status: COMPLETED | OUTPATIENT
Start: 2025-04-21 | End: 2025-04-21

## 2025-04-21 RX ADMIN — METHYLPREDNISOLONE ACETATE 20 MG: 40 INJECTION, SUSPENSION INTRA-ARTICULAR; INTRALESIONAL; INTRAMUSCULAR; SOFT TISSUE at 15:07

## 2025-04-21 SDOH — ECONOMIC STABILITY: FOOD INSECURITY: WITHIN THE PAST 12 MONTHS, THE FOOD YOU BOUGHT JUST DIDN'T LAST AND YOU DIDN'T HAVE MONEY TO GET MORE.: NEVER TRUE

## 2025-04-21 SDOH — ECONOMIC STABILITY: FOOD INSECURITY: WITHIN THE PAST 12 MONTHS, YOU WORRIED THAT YOUR FOOD WOULD RUN OUT BEFORE YOU GOT MONEY TO BUY MORE.: NEVER TRUE

## 2025-04-21 ASSESSMENT — PATIENT HEALTH QUESTIONNAIRE - PHQ9
6. FEELING BAD ABOUT YOURSELF - OR THAT YOU ARE A FAILURE OR HAVE LET YOURSELF OR YOUR FAMILY DOWN: NOT AT ALL
9. THOUGHTS THAT YOU WOULD BE BETTER OFF DEAD, OR OF HURTING YOURSELF: NOT AT ALL
SUM OF ALL RESPONSES TO PHQ QUESTIONS 1-9: 2
3. TROUBLE FALLING OR STAYING ASLEEP: NOT AT ALL
SUM OF ALL RESPONSES TO PHQ QUESTIONS 1-9: 2
4. FEELING TIRED OR HAVING LITTLE ENERGY: NOT AT ALL
5. POOR APPETITE OR OVEREATING: NOT AT ALL
8. MOVING OR SPEAKING SO SLOWLY THAT OTHER PEOPLE COULD HAVE NOTICED. OR THE OPPOSITE, BEING SO FIGETY OR RESTLESS THAT YOU HAVE BEEN MOVING AROUND A LOT MORE THAN USUAL: NOT AT ALL
10. IF YOU CHECKED OFF ANY PROBLEMS, HOW DIFFICULT HAVE THESE PROBLEMS MADE IT FOR YOU TO DO YOUR WORK, TAKE CARE OF THINGS AT HOME, OR GET ALONG WITH OTHER PEOPLE: NOT DIFFICULT AT ALL
2. FEELING DOWN, DEPRESSED OR HOPELESS: SEVERAL DAYS
1. LITTLE INTEREST OR PLEASURE IN DOING THINGS: SEVERAL DAYS
SUM OF ALL RESPONSES TO PHQ QUESTIONS 1-9: 2
7. TROUBLE CONCENTRATING ON THINGS, SUCH AS READING THE NEWSPAPER OR WATCHING TELEVISION: NOT AT ALL
SUM OF ALL RESPONSES TO PHQ QUESTIONS 1-9: 2

## 2025-04-21 NOTE — PROGRESS NOTES
Chief Complaint   Patient presents with    Cold Symptoms       SUBJECTIVE:    Frederic Jacob is a 54 y.o. male who is here today with complaints of productive cough, sore throat, thick nasal discharge, and bodyaches/chills that started approximately 3 days ago.  He states he checked for COVID at home which was \"negative.\"  He states his symptoms have been progressively worse.  He has been using some over-the-counter Sudafed as well as Cepacol lozenges.  He has had no shortness of breath or wheezing.  He has had a low-grade temperature since the onset of his symptoms.  He denies any chest pain or chest pressure.    Current Outpatient Medications   Medication Sig Dispense Refill    LORazepam (ATIVAN) 0.5 MG tablet TAKE ONE TABLET BY MOUTH EVERY NIGHT AS NEEDED FOR ANXIETY 90 tablet 0    hydroCHLOROthiazide 12.5 MG tablet TAKE ONE TABLET BY MOUTH ONE TIME DAILY FOR HIGH BLOOD PRESSURE 90 tablet 1    ezetimibe (ZETIA) 10 MG tablet Take 1 tablet by mouth daily 90 tablet 1    buPROPion (WELLBUTRIN XL) 300 MG extended release tablet TAKE ONE TABLET BY MOUTH EVERY MORNING 90 tablet 1    losartan (COZAAR) 100 MG tablet Take 1 tablet by mouth daily 90 tablet 1    nebivolol (BYSTOLIC) 10 MG tablet TAKE ONE TABLET BY MOUTH EVERY MORNING FOR BLOOD PRESSURE 90 tablet 1    aspirin 81 MG EC tablet Take 1 tablet by mouth daily      dupilumab (DUPIXENT) 300 MG/2ML SOPN injection ceived the following from [facility]: Outside name: Dupixent Pen 300 mg/2 mL pnij       No current facility-administered medications for this visit.     Past Medical History:   Diagnosis Date    Anxiety 2019    Fractured hand 12/2024    right side    HTN (hypertension)      Past Surgical History:   Procedure Laterality Date    APPENDECTOMY      CARDIAC CATHETERIZATION       No Known Allergies    REVIEW OF SYSTEMS:                                        POSITIVE= bold text  Negative = regular text    General:                     fever,

## 2025-04-21 NOTE — PATIENT INSTRUCTIONS
NOTE: Please use Mucinex to help loosen chest congestion.  Discontinue Sudafed!  Please do not use this in the future.  You can use Coricidin HBP for additional symptom management.  Cepacol lozenges are okay to use for sore throat.

## 2025-04-21 NOTE — PROGRESS NOTES
Frederic Jacob is a 54 y.o. male     Chief Complaint   Patient presents with    Cold Symptoms       /74   Pulse 79   Temp 99.6 °F (37.6 °C) (Oral)   Resp 16   Ht 1.8 m (5' 10.87\")   Wt 118.8 kg (262 lb)   SpO2 95%   BMI 36.68 kg/m²     Health Maintenance Due   Topic Date Due    Hepatitis B vaccine (1 of 3 - 19+ 3-dose series) Never done    Pneumococcal 50+ years Vaccine (1 of 1 - PCV) Never done    Shingles vaccine (2 of 2) 08/15/2022    COVID-19 Vaccine (5 - 2024-25 season) 09/01/2024    Depression Monitoring  02/23/2025         \"Have you been to the ER, urgent care clinic since your last visit?  Hospitalized since your last visit?\"    NO    “Have you seen or consulted any other health care providers outside of [facility] since your last visit?”    NO    Administered Methylprednisolone in right deltoid patient tolerated injection well.    Lot#:[id number]    Exp:5/31/2026    NDC:[address]-3

## 2025-04-23 DIAGNOSIS — I10 ESSENTIAL HYPERTENSION: ICD-10-CM

## 2025-04-23 NOTE — TELEPHONE ENCOUNTER
PCP: Fredy Garcia APRN - NP    Last appt: 4/21/2025    Future Appointments   Date Time Provider Department Center   6/26/2025  9:00 AM Fredy Garcia APRN - NP [facility] DE[facility]       Requested Prescriptions     Pending Prescriptions Disp Refills    nebivolol (BYSTOLIC) 10 MG tablet [Pharmacy Med Name: NEBIVOLOL 10 MG TABLET] 90 tablet 1     Sig: TAKE ONE TABLET BY MOUTH EVERY MORNING FOR BLOOD PRESSURE    losartan (COZAAR) 100 MG tablet [Pharmacy Med Name: LOSARTAN 100 MG TAB[*]] 90 tablet 1     Sig: TAKE ONE TABLET BY MOUTH ONE TIME DAILY

## 2025-04-24 RX ORDER — LOSARTAN POTASSIUM 100 MG/1
100 TABLET ORAL DAILY
Qty: 90 TABLET | Refills: 1 | Status: SHIPPED | OUTPATIENT
Start: 2025-04-24

## 2025-04-24 RX ORDER — NEBIVOLOL 10 MG/1
TABLET ORAL
Qty: 90 TABLET | Refills: 1 | Status: SHIPPED | OUTPATIENT
Start: 2025-04-24

## 2025-04-29 ENCOUNTER — TRANSCRIBE ORDERS (OUTPATIENT)
Facility: HOSPITAL | Age: 55
End: 2025-04-29

## 2025-04-29 DIAGNOSIS — J32.4 CHRONIC PANSINUSITIS: Primary | ICD-10-CM

## 2025-06-12 ENCOUNTER — TRANSCRIBE ORDERS (OUTPATIENT)
Facility: HOSPITAL | Age: 55
End: 2025-06-12

## 2025-06-12 DIAGNOSIS — Z95.5 STENTED CORONARY ARTERY: ICD-10-CM

## 2025-06-12 DIAGNOSIS — I21.4 NON-ST ELEVATION MYOCARDIAL INFARCTION (NSTEMI) (HCC): Primary | ICD-10-CM

## 2025-06-22 DIAGNOSIS — F32.1 CURRENT MODERATE EPISODE OF MAJOR DEPRESSIVE DISORDER WITHOUT PRIOR EPISODE (HCC): ICD-10-CM

## 2025-06-23 RX ORDER — BUPROPION HYDROCHLORIDE 300 MG/1
300 TABLET ORAL EVERY MORNING
Qty: 90 TABLET | Refills: 1 | Status: SHIPPED | OUTPATIENT
Start: 2025-06-23

## 2025-06-23 NOTE — TELEPHONE ENCOUNTER
PCP: Fredy Garcia APRN - NP    Last appt: 4/21/2025    Future Appointments   Date Time Provider Department Center   6/26/2025  9:00 AM Fredy Garcia APRN - NP [facility] DE[facility]   7/28/2025  9:00 AM [facility]ARDIOPULM INTAKE [facility]       Requested Prescriptions     Pending Prescriptions Disp Refills    buPROPion (WELLBUTRIN XL) 300 MG extended release tablet [Pharmacy Med Name: BUPROPION  MG TAB[*]] 90 tablet 1     Sig: TAKE ONE TABLET BY MOUTH EVERY MORNING

## 2025-06-26 ENCOUNTER — OFFICE VISIT (OUTPATIENT)
Facility: CLINIC | Age: 55
End: 2025-06-26
Payer: COMMERCIAL

## 2025-06-26 VITALS
TEMPERATURE: 98.4 F | DIASTOLIC BLOOD PRESSURE: 72 MMHG | RESPIRATION RATE: 16 BRPM | OXYGEN SATURATION: 97 % | SYSTOLIC BLOOD PRESSURE: 116 MMHG | HEART RATE: 64 BPM | BODY MASS INDEX: 36.85 KG/M2 | HEIGHT: 71 IN | WEIGHT: 263.2 LBS

## 2025-06-26 DIAGNOSIS — E66.812 CLASS 2 SEVERE OBESITY DUE TO EXCESS CALORIES WITH SERIOUS COMORBIDITY AND BODY MASS INDEX (BMI) OF 36.0 TO 36.9 IN ADULT (HCC): ICD-10-CM

## 2025-06-26 DIAGNOSIS — I10 ESSENTIAL HYPERTENSION: ICD-10-CM

## 2025-06-26 DIAGNOSIS — E78.2 MIXED HYPERLIPIDEMIA: ICD-10-CM

## 2025-06-26 DIAGNOSIS — I25.2 OLD MYOCARDIAL INFARCTION: ICD-10-CM

## 2025-06-26 DIAGNOSIS — I25.10 ATHEROSCLEROSIS OF NATIVE CORONARY ARTERY OF NATIVE HEART WITHOUT ANGINA PECTORIS: ICD-10-CM

## 2025-06-26 DIAGNOSIS — F32.0 CURRENT MILD EPISODE OF MAJOR DEPRESSIVE DISORDER WITHOUT PRIOR EPISODE: ICD-10-CM

## 2025-06-26 DIAGNOSIS — Z00.00 ROUTINE PHYSICAL EXAMINATION: Primary | ICD-10-CM

## 2025-06-26 DIAGNOSIS — Z95.5 HISTORY OF HEART ARTERY STENT: ICD-10-CM

## 2025-06-26 DIAGNOSIS — E66.01 CLASS 2 SEVERE OBESITY DUE TO EXCESS CALORIES WITH SERIOUS COMORBIDITY AND BODY MASS INDEX (BMI) OF 36.0 TO 36.9 IN ADULT (HCC): ICD-10-CM

## 2025-06-26 LAB
ALBUMIN SERPL-MCNC: 3.9 G/DL (ref 3.5–5)
ALBUMIN/GLOB SERPL: 1.9 (ref 1.1–2.2)
ALP SERPL-CCNC: 48 U/L (ref 45–117)
ALT SERPL-CCNC: 33 U/L (ref 12–78)
ANION GAP SERPL CALC-SCNC: 2 MMOL/L (ref 2–12)
AST SERPL-CCNC: 27 U/L (ref 15–37)
BASOPHILS # BLD: 0.04 K/UL (ref 0–0.1)
BASOPHILS NFR BLD: 0.6 % (ref 0–1)
BILIRUB SERPL-MCNC: 0.7 MG/DL (ref 0.2–1)
BUN SERPL-MCNC: 14 MG/DL (ref 6–20)
BUN/CREAT SERPL: 12 (ref 12–20)
CALCIUM SERPL-MCNC: 9.1 MG/DL (ref 8.5–10.1)
CHLORIDE SERPL-SCNC: 108 MMOL/L (ref 97–108)
CHOLEST SERPL-MCNC: 95 MG/DL
CO2 SERPL-SCNC: 30 MMOL/L (ref 21–32)
CREAT SERPL-MCNC: 1.15 MG/DL (ref 0.7–1.3)
DIFFERENTIAL METHOD BLD: ABNORMAL
EOSINOPHIL # BLD: 0.17 K/UL (ref 0–0.4)
EOSINOPHIL NFR BLD: 2.4 % (ref 0–7)
ERYTHROCYTE [DISTWIDTH] IN BLOOD BY AUTOMATED COUNT: 14 % (ref 11.5–14.5)
GLOBULIN SER CALC-MCNC: 2.1 G/DL (ref 2–4)
GLUCOSE SERPL-MCNC: 90 MG/DL (ref 65–100)
HCT VFR BLD AUTO: 49.4 % (ref 36.6–50.3)
HDLC SERPL-MCNC: 42 MG/DL
HDLC SERPL: 2.3 (ref 0–5)
HGB BLD-MCNC: 15.4 G/DL (ref 12.1–17)
IMM GRANULOCYTES # BLD AUTO: 0.02 K/UL (ref 0–0.04)
IMM GRANULOCYTES NFR BLD AUTO: 0.3 % (ref 0–0.5)
LDLC SERPL CALC-MCNC: 35.4 MG/DL (ref 0–100)
LYMPHOCYTES # BLD: 1.61 K/UL (ref 0.8–3.5)
LYMPHOCYTES NFR BLD: 23 % (ref 12–49)
MCH RBC QN AUTO: 30.9 PG (ref 26–34)
MCHC RBC AUTO-ENTMCNC: 31.2 G/DL (ref 30–36.5)
MCV RBC AUTO: 99.2 FL (ref 80–99)
MONOCYTES # BLD: 0.63 K/UL (ref 0–1)
MONOCYTES NFR BLD: 9 % (ref 5–13)
NEUTS SEG # BLD: 4.53 K/UL (ref 1.8–8)
NEUTS SEG NFR BLD: 64.7 % (ref 32–75)
NRBC # BLD: 0 K/UL (ref 0–0.01)
NRBC BLD-RTO: 0 PER 100 WBC
PLATELET # BLD AUTO: 127 K/UL (ref 150–400)
PMV BLD AUTO: 12.3 FL (ref 8.9–12.9)
POTASSIUM SERPL-SCNC: 4.1 MMOL/L (ref 3.5–5.1)
PROT SERPL-MCNC: 6 G/DL (ref 6.4–8.2)
RBC # BLD AUTO: 4.98 M/UL (ref 4.1–5.7)
SODIUM SERPL-SCNC: 140 MMOL/L (ref 136–145)
TRIGL SERPL-MCNC: 88 MG/DL
VLDLC SERPL CALC-MCNC: 17.6 MG/DL
WBC # BLD AUTO: 7 K/UL (ref 4.1–11.1)

## 2025-06-26 PROCEDURE — 3078F DIAST BP <80 MM HG: CPT | Performed by: NURSE PRACTITIONER

## 2025-06-26 PROCEDURE — 3074F SYST BP LT 130 MM HG: CPT | Performed by: NURSE PRACTITIONER

## 2025-06-26 PROCEDURE — 99396 PREV VISIT EST AGE 40-64: CPT | Performed by: NURSE PRACTITIONER

## 2025-06-26 RX ORDER — TICAGRELOR 90 MG/1
90 TABLET, FILM COATED ORAL 2 TIMES DAILY
COMMUNITY
Start: 2025-06-22

## 2025-06-26 RX ORDER — TADALAFIL 5 MG/1
5 TABLET ORAL DAILY
COMMUNITY
Start: 2025-06-23

## 2025-06-26 RX ORDER — ROSUVASTATIN CALCIUM 20 MG/1
20 TABLET, COATED ORAL DAILY
COMMUNITY
Start: 2025-06-22

## 2025-06-26 RX ORDER — TAMSULOSIN HYDROCHLORIDE 0.4 MG/1
0.4 CAPSULE ORAL DAILY
COMMUNITY
Start: 2025-06-02

## 2025-06-26 NOTE — PROGRESS NOTES
Chief Complaint   Patient presents with    Annual Exam       SUBJECTIVE:    Frederic Jacob is a 54 y.o. male who is here today for a routine physical exam as well as follow up appointment regarding current medical conditions including: Major depression, severe obesity, essential hypertension, mixed hyperlipidemia, CAD, old MI, and coronary artery stents.  He is fasting today.    The patient remains on Wellbutrin 300 mg daily for management of his depression.  His depression has been fairly stable overall.  He is tolerating the medication well and denies any adverse side effects.    He remains on a combination of hydrochlorothiazide, losartan, and Nebivolol for management of his hypertension.  His blood pressure has remained stable and in good control overall.  He denies any adverse side effects of the medication.  He continues to take rosuvastatin and ezetimibe for cholesterol management and tolerates this well.  He is established with a new cardiologist, and recently had a heart catheterization performed and a stent was placed.  He is enrolled in cardiopulmonary rehab and will start this soon.  He has had no further episodes of chest pain, chest pressure, shortness of breath, headaches, dizziness, blurred vision, palpitations, or syncope episodes.       Current Outpatient Medications   Medication Sig Dispense Refill    rosuvastatin (CRESTOR) 20 MG tablet Take 1 tablet by mouth daily      ticagrelor (BRILINTA) 90 MG TABS tablet Take 1 tablet by mouth 2 times daily      tamsulosin (FLOMAX) 0.4 MG capsule Take 1 capsule by mouth daily      tadalafil (CIALIS) 5 MG tablet Take 1 tablet by mouth daily      amoxicillin-clavulanate (AUGMENTIN) 875-125 MG per tablet Take 1 tablet by mouth 2 times daily      buPROPion (WELLBUTRIN XL) 300 MG extended release tablet TAKE ONE TABLET BY MOUTH EVERY MORNING 90 tablet 1    nebivolol (BYSTOLIC) 10 MG tablet TAKE ONE TABLET BY MOUTH EVERY MORNING FOR BLOOD PRESSURE 90 tablet 1

## 2025-06-26 NOTE — PROGRESS NOTES
Frederic Jcaob is a 54 y.o. male     Chief Complaint   Patient presents with    Annual Exam       /72   Pulse 64   Temp 98.4 °F (36.9 °C) (Oral)   Resp 16   Ht 1.8 m (5' 10.87\")   Wt 119.4 kg (263 lb 3.2 oz)   SpO2 97%   BMI 36.84 kg/m²     Health Maintenance Due   Topic Date Due    Hepatitis B vaccine (1 of 3 - 19+ 3-dose series) Never done    Pneumococcal 50+ years Vaccine (1 of 1 - PCV) Never done    Shingles vaccine (2 of 2) 08/15/2022    COVID-19 Vaccine (5 - 2024-25 season) 09/01/2024    Prostate Specific Antigen (PSA) Screening or Monitoring  06/21/2025         \"Have you been to the ER, urgent care clinic since your last visit?  Hospitalized since your last visit?\"    YES - When: approximately 1 months ago.  Where and Why: [facility]    “Have you seen or consulted any other health care providers outside of [facility] since your last visit?”    NO

## 2025-06-27 ENCOUNTER — RESULTS FOLLOW-UP (OUTPATIENT)
Facility: CLINIC | Age: 55
End: 2025-06-27

## 2025-07-09 ENCOUNTER — PATIENT MESSAGE (OUTPATIENT)
Facility: CLINIC | Age: 55
End: 2025-07-09

## 2025-07-28 ENCOUNTER — HOSPITAL ENCOUNTER (OUTPATIENT)
Facility: HOSPITAL | Age: 55
Setting detail: RECURRING SERIES
Discharge: HOME | End: 2025-07-31
Payer: COMMERCIAL

## 2025-07-28 VITALS — BODY MASS INDEX: 36.26 KG/M2 | WEIGHT: 259 LBS

## 2025-07-28 PROCEDURE — 93797 PHYS/QHP OP CAR RHAB WO ECG: CPT

## 2025-07-28 PROCEDURE — 93798 PHYS/QHP OP CAR RHAB W/ECG: CPT

## 2025-07-28 RX ORDER — LORAZEPAM 0.5 MG/1
0.5 TABLET ORAL EVERY 6 HOURS PRN
COMMUNITY

## 2025-07-28 ASSESSMENT — PATIENT HEALTH QUESTIONNAIRE - PHQ9
3. TROUBLE FALLING OR STAYING ASLEEP: NEARLY EVERY DAY
5. POOR APPETITE OR OVEREATING: SEVERAL DAYS
8. MOVING OR SPEAKING SO SLOWLY THAT OTHER PEOPLE COULD HAVE NOTICED. OR THE OPPOSITE, BEING SO FIGETY OR RESTLESS THAT YOU HAVE BEEN MOVING AROUND A LOT MORE THAN USUAL: NEARLY EVERY DAY
SUM OF ALL RESPONSES TO PHQ QUESTIONS 1-9: 21
SUM OF ALL RESPONSES TO PHQ QUESTIONS 1-9: 18
2. FEELING DOWN, DEPRESSED OR HOPELESS: NEARLY EVERY DAY
SUM OF ALL RESPONSES TO PHQ QUESTIONS 1-9: 21
9. THOUGHTS THAT YOU WOULD BE BETTER OFF DEAD, OR OF HURTING YOURSELF: NEARLY EVERY DAY
SUM OF ALL RESPONSES TO PHQ QUESTIONS 1-9: 21
6. FEELING BAD ABOUT YOURSELF - OR THAT YOU ARE A FAILURE OR HAVE LET YOURSELF OR YOUR FAMILY DOWN: MORE THAN HALF THE DAYS
7. TROUBLE CONCENTRATING ON THINGS, SUCH AS READING THE NEWSPAPER OR WATCHING TELEVISION: MORE THAN HALF THE DAYS
4. FEELING TIRED OR HAVING LITTLE ENERGY: MORE THAN HALF THE DAYS
1. LITTLE INTEREST OR PLEASURE IN DOING THINGS: MORE THAN HALF THE DAYS

## 2025-07-28 ASSESSMENT — EJECTION FRACTION: EF_VALUE: 60

## 2025-07-28 NOTE — CARDIO/PULMONARY
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    Patient denied chest pain or SOB during 6 minute walk test and was in  . Patient walked 340 meters meters or   feet or   mi at a speed of 2.5 mph and grade of 0 % for a final MET level of 3.0.   Exercise prescription developed using exercise tolerance results and patient stated goals, to be supplemented with home exercise recommendations. Education manual given to patient and reviewed. Patient will attend cardiac rehab 2 times/week. Pt declined education classes and nutrition 1:1 consultation at this time.     Patient states that he would like to accomplish the following by completion of the program:    Lose 5-10 % of body weight by visit 36. (Goal weight  233-246 lbs)  Lower PHQ-( from severe to moderate, 7 points by vist 36. (currently 21)    Intake Start Time: 0904  Intake End Time: 1034    DOTTY MONTOYA RN

## 2025-07-31 ENCOUNTER — HOSPITAL ENCOUNTER (OUTPATIENT)
Facility: HOSPITAL | Age: 55
Setting detail: RECURRING SERIES
End: 2025-07-31
Payer: COMMERCIAL

## 2025-07-31 VITALS — BODY MASS INDEX: 35.98 KG/M2 | WEIGHT: 257 LBS

## 2025-07-31 PROCEDURE — 93798 PHYS/QHP OP CAR RHAB W/ECG: CPT

## 2025-07-31 ASSESSMENT — EXERCISE STRESS TEST
PEAK_RPE: 12
PEAK_HR: 89
PEAK_METS: 2.9

## 2025-08-04 ENCOUNTER — HOSPITAL ENCOUNTER (OUTPATIENT)
Facility: HOSPITAL | Age: 55
Setting detail: RECURRING SERIES
Discharge: HOME | End: 2025-08-07
Payer: COMMERCIAL

## 2025-08-04 VITALS — BODY MASS INDEX: 35.98 KG/M2 | WEIGHT: 257 LBS

## 2025-08-04 PROCEDURE — 93798 PHYS/QHP OP CAR RHAB W/ECG: CPT

## 2025-08-04 ASSESSMENT — EXERCISE STRESS TEST
PEAK_HR: 93
PEAK_RPE: 12
PEAK_METS: 2.9

## 2025-08-07 ENCOUNTER — APPOINTMENT (OUTPATIENT)
Facility: HOSPITAL | Age: 55
End: 2025-08-07
Payer: COMMERCIAL

## 2025-08-11 ENCOUNTER — HOSPITAL ENCOUNTER (OUTPATIENT)
Facility: HOSPITAL | Age: 55
Setting detail: RECURRING SERIES
Discharge: HOME | End: 2025-08-14
Payer: COMMERCIAL

## 2025-08-11 VITALS — BODY MASS INDEX: 36.06 KG/M2 | WEIGHT: 257.6 LBS

## 2025-08-11 PROCEDURE — 93798 PHYS/QHP OP CAR RHAB W/ECG: CPT

## 2025-08-11 ASSESSMENT — EXERCISE STRESS TEST
PEAK_HR: 114
PEAK_RPE: 12
PEAK_METS: 2.9

## 2025-08-14 ENCOUNTER — HOSPITAL ENCOUNTER (OUTPATIENT)
Facility: HOSPITAL | Age: 55
Setting detail: RECURRING SERIES
Discharge: HOME | End: 2025-08-17
Payer: COMMERCIAL

## 2025-08-14 VITALS — BODY MASS INDEX: 36.26 KG/M2 | WEIGHT: 259 LBS

## 2025-08-14 PROCEDURE — 93798 PHYS/QHP OP CAR RHAB W/ECG: CPT

## 2025-08-14 ASSESSMENT — EXERCISE STRESS TEST
PEAK_RPE: 12
PEAK_METS: 3.8
PEAK_HR: 97

## 2025-08-17 DIAGNOSIS — F41.9 ANXIETY: Primary | ICD-10-CM

## 2025-08-17 DIAGNOSIS — F51.04 PSYCHOPHYSIOLOGICAL INSOMNIA: ICD-10-CM

## 2025-08-18 ENCOUNTER — HOSPITAL ENCOUNTER (OUTPATIENT)
Facility: HOSPITAL | Age: 55
Setting detail: RECURRING SERIES
Discharge: HOME | End: 2025-08-21
Payer: COMMERCIAL

## 2025-08-18 VITALS — BODY MASS INDEX: 35.84 KG/M2 | WEIGHT: 256 LBS

## 2025-08-18 PROCEDURE — 93798 PHYS/QHP OP CAR RHAB W/ECG: CPT

## 2025-08-18 RX ORDER — LORAZEPAM 0.5 MG/1
TABLET ORAL
Qty: 90 TABLET | Refills: 0 | Status: SHIPPED | OUTPATIENT
Start: 2025-08-18 | End: 2025-11-16

## 2025-08-18 ASSESSMENT — EXERCISE STRESS TEST
PEAK_RPE: 12
PEAK_HR: 92
PEAK_METS: 3.8

## 2025-08-21 ENCOUNTER — APPOINTMENT (OUTPATIENT)
Facility: HOSPITAL | Age: 55
End: 2025-08-21
Payer: COMMERCIAL

## 2025-08-25 ENCOUNTER — HOSPITAL ENCOUNTER (OUTPATIENT)
Facility: HOSPITAL | Age: 55
Setting detail: RECURRING SERIES
Discharge: HOME | End: 2025-08-28
Payer: COMMERCIAL

## 2025-08-25 VITALS — BODY MASS INDEX: 35.7 KG/M2 | WEIGHT: 255 LBS

## 2025-08-25 PROCEDURE — 93798 PHYS/QHP OP CAR RHAB W/ECG: CPT

## 2025-08-25 ASSESSMENT — EXERCISE STRESS TEST
PEAK_METS: 4.4
PEAK_HR: 99
PEAK_RPE: 12

## 2025-08-28 ENCOUNTER — HOSPITAL ENCOUNTER (OUTPATIENT)
Facility: HOSPITAL | Age: 55
Setting detail: RECURRING SERIES
Discharge: HOME | End: 2025-08-31
Payer: COMMERCIAL

## 2025-08-28 VITALS — WEIGHT: 255 LBS | BODY MASS INDEX: 35.7 KG/M2

## 2025-08-28 PROCEDURE — 93798 PHYS/QHP OP CAR RHAB W/ECG: CPT

## (undated) DEVICE — GUIDEWIRE VASC L150CM DIA0.035IN TIP L3MM PTFE J CRV FIX

## (undated) DEVICE — CATH GUID COR JR4.0 6FR 100CM -- LAUNCHER

## (undated) DEVICE — SURGICAL PROCEDURE TRAY CRD CATH 3 PRT

## (undated) DEVICE — CATHETER ETER VASC OD6FR CARD 145DEG PGTL BRAID JL40 JR40 MP

## (undated) DEVICE — BOWL UTIL 16OZ STRL --

## (undated) DEVICE — PRESSURE TUBING: Brand: TRUWAVE

## (undated) DEVICE — DEVICE INFL SYR BLLN ENDO 30 -- INTELLI

## (undated) DEVICE — SYRINGE MED 10ML RED POLYCARB BRL FIX M LUER CONN FLAT GRP

## (undated) DEVICE — Device: Brand: ASAHI SION BLUE

## (undated) DEVICE — RUNTHROUGH NS EXTRA FLOPPY PTCA GUIDEWIRE: Brand: RUNTHROUGH

## (undated) DEVICE — SYRINGE ANGIO 20ML WHT POLYCARB VAC PRSS CAP PLUNG FIX M

## (undated) DEVICE — TOOL INSRT ANGI GDWIRE MTL SS --

## (undated) DEVICE — PINNACLE INTRODUCER SHEATH: Brand: PINNACLE

## (undated) DEVICE — ANGIO-SEAL VIP VASCULAR CLOSURE DEVICE: Brand: ANGIO-SEAL

## (undated) DEVICE — CATH BLLN DIL 2.0X12MM RX -- EUPHORA